# Patient Record
Sex: MALE | Race: WHITE | NOT HISPANIC OR LATINO | Employment: OTHER | URBAN - METROPOLITAN AREA
[De-identification: names, ages, dates, MRNs, and addresses within clinical notes are randomized per-mention and may not be internally consistent; named-entity substitution may affect disease eponyms.]

---

## 2017-01-03 ENCOUNTER — OFFICE VISIT (OUTPATIENT)
Dept: CARDIOLOGY | Facility: CLINIC | Age: 72
End: 2017-01-03

## 2017-01-03 VITALS
HEIGHT: 70 IN | DIASTOLIC BLOOD PRESSURE: 86 MMHG | BODY MASS INDEX: 30.43 KG/M2 | SYSTOLIC BLOOD PRESSURE: 142 MMHG | WEIGHT: 212.6 LBS | HEART RATE: 70 BPM

## 2017-01-03 DIAGNOSIS — I44.2 CHB (COMPLETE HEART BLOCK) (HCC): Primary | ICD-10-CM

## 2017-01-03 DIAGNOSIS — Z95.2 S/P AVR (AORTIC VALVE REPLACEMENT): ICD-10-CM

## 2017-01-03 DIAGNOSIS — Z95.0 PACEMAKER: ICD-10-CM

## 2017-01-03 PROCEDURE — 93288 INTERROG EVL PM/LDLS PM IP: CPT | Performed by: INTERNAL MEDICINE

## 2017-01-03 RX ORDER — ATORVASTATIN CALCIUM 40 MG/1
40 TABLET, FILM COATED ORAL DAILY
COMMUNITY

## 2017-01-03 NOTE — PROGRESS NOTES
Primary Provider:  CHERYL España  CC:CHB    Problem List:  Patient Active Problem List   Diagnosis   • CHB (complete heart block)   • Pacemaker   • Chest tightness   • S/P AVR (aortic valve replacement)   • Essential hypertension   • Dyslipidemia   • COPD (chronic obstructive pulmonary disease)   • Gastroesophageal reflux disease without esophagitis   • Hyperlipidemia   • Hiatal hernia   • Arthritis   PROBLEM LIST:   1. Complete heart block:  a. Status post dual chamber [employer]el pacemaker, 09/16/2013.   2. Status post AVR secondary to bicuspid aortic valve.    3. Ascending aortic aneurysm repair in 2008.    4. Hypertension.  5. Dyslipidemia.  6. COPD.  7. TURP.  8. GERD/hiatal hernia.  9. Arthritis.  10. Hyperlipidemia.        HPI:  The patient denies any chest pain, sob, or chf symptoms.    Allergies   Allergen Reactions   • Percocet [Oxycodone-Acetaminophen]        Current Outpatient Prescriptions   Medication Sig Dispense Refill   • albuterol (PROVENTIL) (2.5 MG/3ML) 0.083% nebulizer solution Take 2.5 mg by nebulization 2 (two) times a day.     • aspirin 81 MG EC tablet Take 81 mg by mouth daily.     • atorvastatin (LIPITOR) 40 MG tablet Take 40 mg by mouth Daily.     • fluticasone-salmeterol (ADVAIR) 100-50 MCG/DOSE DISKUS Inhale 1 puff daily.     • HYDROcodone-acetaminophen (VICODIN) 7.5-500 MG per tablet Take 1 tablet by mouth every 6 (six) hours as needed for moderate pain (4-6).     • ibuprofen (ADVIL,MOTRIN) 200 MG tablet Take 200 mg by mouth every 6 (six) hours as needed for mild pain (1-3).     • ipratropium-albuterol (COMBIVENT RESPIMAT)  MCG/ACT inhaler Inhale 2 puffs 4 (four) times a day as needed for wheezing.     • lisinopril-hydrochlorothiazide (PRINZIDE,ZESTORETIC) 20-12.5 MG per tablet Take 1 tablet by mouth daily.     • lovastatin (MEVACOR) 20 MG tablet Take 20 mg by mouth every night.     • metoprolol tartrate (LOPRESSOR) 25 MG tablet Take 25 mg by mouth 2 (two) times a day.    "  • nystatin (MYCOSTATIN) 890271 UNIT/GM cream Apply  topically As Needed.     • omeprazole (PriLOSEC) 20 MG capsule Take 20 mg by mouth daily.       No current facility-administered medications for this visit.        Visit Vitals   • /86 (BP Location: Right arm, Patient Position: Sitting)   • Pulse 70   • Ht 69.5\" (176.5 cm)   • Wt 212 lb 9.6 oz (96.4 kg)   • BMI 30.95 kg/m2         Device Check    Company [employer] Noel  Mode DDD  Lower Rate 70bpm  Upper rate 130bpm         Thresholds    Atrial Pacing 0.5Volts@0.4ms  Atrial Sensing 2.6mV  Atrial Impedence 400Ohms  Percent Pacing 69%    Right Ventricular Pacing 0.62Volts@0.4ms  Right Ventricular Sensing >12mV  Right Ventricular Impedence 630Ohms  Percent Pacing 99%      Battery Voltage 2.93Volts  Longevity 8.4      Episodes No episodes    Reprogramming No changes    Comments Normal pacemaker function.     Diagnosis Plan   1. CHB (complete heart block)  Normal pacemaker interrogation.  Return for follow-up in one 6 months .  Follow up with Dr. Bhatia regarding VHD.   2. S/P AVR (aortic valve replacement)     3. Pacemaker       Will Kendrick MONTANO   "

## 2017-07-18 ENCOUNTER — CLINICAL SUPPORT NO REQUIREMENTS (OUTPATIENT)
Dept: CARDIOLOGY | Facility: CLINIC | Age: 72
End: 2017-07-18

## 2017-07-18 DIAGNOSIS — I44.2 CHB (COMPLETE HEART BLOCK) (HCC): ICD-10-CM

## 2017-07-18 PROCEDURE — 93288 INTERROG EVL PM/LDLS PM IP: CPT | Performed by: PHYSICIAN ASSISTANT

## 2023-01-05 NOTE — PROGRESS NOTES
Electrophysiology Clinic Consult     Bryn Lynn  1945  [unfilled]  [unfilled]    01/06/23    DATE OF ADMISSION: (Not on file)  [facility] CARDIOLOGY Sumi Haley APRN  [address] / [facility] [address]  Referring Provider: Sri Arcos MD     Chief Complaint   Patient presents with   • Chest Pain   • Dizziness   • Irregular Heart Beat   • Edema       Problem list:  1. Chronic systolic heart failure/Dilated cardiomyopathy/CHB  a. Dual chamber [employer] pacemaker implant Sept 2013  b. Echo 5/2018: EF 55-60%  c. Echo 10/2018: EF 45%, mild MR  d. Echo 4/2022: EF 30-35%, normal functioning tissue AVR  e. 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 Shaver  2. Valvular heart disease status post aortic valve replacement with tissue valve 2013.  3. CAD  a. LHC 2013, nonobstructive CAD  4. HTN  5. HLD  6. COPD on chronic prednisone and 2 L oxygen as needed.  7. GERD  8. Nicotine use (cessation 3/2022)  9. Surgeries:  a. AAA repair, 2008, at   b. Tissue AVR, 2013, at [facility]  c. Umbilical hernia repair 2/2022  d. Prostatectomy, TURP      HPI:  Bryn Lynn is a 77 year old male with above past medical history referred by Dr. Arcos for evaluation for possible coronary sinus lead. He had a [employer] Noel pacemaker placed in September 2013 in the setting of complete heart block. Due to dilated cardiomyopathy, worsening CHF symptoms and EF of 30-35% despite maximal GDMT, it was decided he would benefit from upgrade to BiV ICD. May 2022 he was upgraded to a dual chamber [employer] AICD by Dr. Shaver however there was difficulty placing the LV lead due to occlusion of the left subclavian vein.  Since that time, the patient has had progressive dyspnea on exertion with class III to class IV heart failure symptoms and excessive fatigue and weakness.  He has a long history of tobacco abuse of  smoking 3 packs/day for 60 years but has stopped recently.  He states that his shortness of breath does improve after taking additional Lasix and he does have lower extremity edema as well as PND.  He denies any near-syncope or syncope.  He states he has been compliant with his medical therapy.  His symptoms at this time are severe in nature.  Blood pressure is stable at home.  He does use oxygen as needed basis for shortness of breath.  Denies any excessive alcohol use.  He is on chronic steroids for his underlying lung disease.  The patient does note substernal chest tightness associated with his dyspnea on exertion.    Allergies   Allergen Reactions   • Percocet [Oxycodone-Acetaminophen] Unknown - Low Severity        Cannot display prior to admission medications because the patient has not been admitted in this contact.            Current Outpatient Medications:   •  albuterol (PROVENTIL) (2.5 MG/3ML) 0.083% nebulizer solution, Take 2.5 mg by nebulization 2 (two) times a day., Disp: , Rfl:   •  amLODIPine (NORVASC) 5 MG tablet, Take 5 mg by mouth Daily., Disp: , Rfl:   •  ascorbic acid (VITAMIN C) 1000 MG tablet, Take 2,000 mg by mouth Daily., Disp: , Rfl:   •  aspirin 81 MG EC tablet, Take 81 mg by mouth daily., Disp: , Rfl:   •  atorvastatin (LIPITOR) 40 MG tablet, Take 40 mg by mouth Daily., Disp: , Rfl:   •  carvedilol (COREG) 6.25 MG tablet, Take 6.25 mg by mouth 2 (Two) Times a Day With Meals., Disp: , Rfl:   •  cholecalciferol (VITAMIN D3) 25 MCG (1000 UT) tablet, Take 1,000 Units by mouth Daily., Disp: , Rfl:   •  diphenhydrAMINE (Banophen) 25 mg capsule, Take 25 mg by mouth Every 6 (Six) Hours As Needed for Itching., Disp: , Rfl:   •  finasteride (PROSCAR) 5 MG tablet, Take 5 mg by mouth Daily., Disp: , Rfl:   •  fluticasone (FLONASE) 50 MCG/ACT nasal spray, 2 sprays into the nostril(s) as directed by provider Daily., Disp: , Rfl:   •  fluticasone-salmeterol (ADVAIR) 100-50 MCG/DOSE DISKUS, Inhale 1 puff  daily., Disp: , Rfl:   •  furosemide (LASIX) 40 MG tablet, Take 40 mg by mouth Daily., Disp: , Rfl:   •  HYDROcodone-acetaminophen (VICODIN) 7.5-500 MG per tablet, Take 1 tablet by mouth every 6 (six) hours as needed for moderate pain (4-6)., Disp: , Rfl:   •  meclizine (ANTIVERT) 25 MG tablet, Take 25 mg by mouth 3 (Three) Times a Day As Needed for Dizziness., Disp: , Rfl:   •  montelukast (SINGULAIR) 10 MG tablet, Take 10 mg by mouth Every Night., Disp: , Rfl:   •  multivitamin with minerals tablet tablet, Take 1 tablet by mouth Daily., Disp: , Rfl:   •  O2 (OXYGEN), Inhale 2 L/min As Needed., Disp: , Rfl:   •  Omega 3 1000 MG capsule, Take  by mouth., Disp: , Rfl:   •  omeprazole (PriLOSEC) 20 MG capsule, Take 20 mg by mouth daily., Disp: , Rfl:   •  predniSONE (DELTASONE) 5 MG tablet, Take 5 mg by mouth Daily., Disp: , Rfl:   •  sacubitril-valsartan (ENTRESTO) 49-51 MG tablet, Take 1 tablet by mouth 2 (Two) Times a Day., Disp: , Rfl:   •  spironolactone (ALDACTONE) 25 MG tablet, Take 25 mg by mouth Daily., Disp: , Rfl:   •  Umeclidinium Bromide (Incruse Ellipta) 62.5 MCG/ACT aerosol powder , Inhale 1 puff As Needed., Disp: , Rfl:   •  Vitamin A 3 MG (51384 UT) capsule, Take 10,000 Units by mouth Daily., Disp: , Rfl:   •  vitamin B-12 (CYANOCOBALAMIN) 1000 MCG tablet, Take 1,000 mcg by mouth Daily., Disp: , Rfl:   •  vitamin E 100 UNIT capsule, Take 100 Units by mouth Daily., Disp: , Rfl:     Social History     Socioeconomic History   • Marital status:    Tobacco Use   • Smoking status: Former     Packs/day: 0.50     Types: Cigarettes     Start date: 1963   • Smokeless tobacco: Never   Substance and Sexual Activity   • Alcohol use: No   • Drug use: No   • Sexual activity: Defer       Family History   Problem Relation Age of Onset   • Heart attack Mother    • Kidney failure Mother    • Heart failure Father        REVIEW OF SYSTEMS:   CONST:  No weight loss, fever, chills, weakness + fatigue.   HEENT:  No  visual loss, blurred vision, double vision, yellow sclerae.                   No hearing loss, congestion, sore throat.   SKIN:      No rashes, urticaria, ulcers, sores.     RESP:     + shortness of breath, hemoptysis, +cough, sputum.   GI:           No anorexia, nausea, vomiting, diarrhea. No abdominal pain, melena.   :         No burning on urination, hematuria or increased frequency.  ENDO:    No diaphoresis, cold or heat intolerance. No polyuria or polydipsia.   NEURO:  No headache, dizziness, syncope, paralysis, ataxia, or parasthesias.                  No change in bowel or bladder control. No history of CVA/TIA  MUSC:    + muscle, back pain, joint pain or stiffness.   HEME:    No anemia, bleeding, bruising. No history of DVT/PE.  PSYCH:  No history of depression, anxiety    Vitals:    01/06/23 1123   BP: 138/72   BP Location: Left arm   Patient Position: Sitting   Pulse: 88   SpO2: 97%   Weight: 79.8 kg (176 lb)   Height: 176.5 cm (69.5\")                 Physical Exam:  GEN: Well nourished, well-developed, no acute distress  HEENT: Normocephalic, atraumatic, PERRLA, moist mucous membranes  NECK: Supple, NO JVD, no thyromegaly, no lymphadenopathy  CARD: Normal S1 and S2.  Regular rate rhythm.  There is an S4 as well as S3 present.  PMI not appreciated.  LUNGS: Decreased breath sounds bilaterally.  Respiratory effort poor  ABDOMEN: Soft, nontender, normal bowel sounds  EXTREMITIES: No gross deformities, no clubbing, cyanosis, or edema  SKIN: Warm, dry  NEURO: No focal deficits, alert and oriented x 3  PSYCHIATRIC: Normal affect and mood      I personally viewed and interpreted the patient's EKG/Telemetry/lab data    Lab Results   Component Value Date    GLUCOSE 87 03/16/2016    CALCIUM 10.1 03/16/2016     03/16/2016    K 4.1 03/16/2016    CO2 32 (H) 03/16/2016     03/16/2016    BUN 15 03/16/2016    CREATININE 0.9 03/16/2016    ANIONGAP 7 03/16/2016     Lab Results   Component Value Date    WBC  12.07 (H) 03/16/2016    HGB 14.5 03/16/2016    HCT 42.1 03/16/2016    MCV 89.8 03/16/2016     (L) 03/16/2016     No results found for: INR, PROTIME  No results found for: TSH, A1SEOAV, M3FOWWP, THYROIDAB    ICD interrogation demonstrates normal DDD ICD function.  99% RV paced.  5% right atrial paced.  Nonsustained VT noted.  No atrial fibrillation.      ECG 12 Lead    Date/Time: 1/6/2023 11:39 AM  Performed by: Glynn June MD  Authorized by: Glynn June MD   Comparison: compared with previous ECG from 3/17/2016  Similar to previous ECG  Rhythm: sinus rhythm and paced  BPM: 85          Interrogation of ICD today demonstrates normal DDDR pacemaker function.  100% RV paced.  No underlying R waves seen today.  12 years on the battery.  No atrial fibrillation.     Diagnosis Plan   1. CHF (congestive heart failure), NYHA class III, chronic, systolic ([facility])  ECG 12 Lead      2. CHB (complete heart block) ([facility])  ECG 12 Lead      3. Non-sustained ventricular tachycardia  ECG 12 Lead      4. S/P AVR  ECG 12 Lead      5. Primary hypertension          Impression/plan:    1.  Dyspnea on exertion inpatient with COPD's and on home oxygen in the setting of severe LV dysfunction and class III congestive heart failure symptoms despite optimal medical therapy.  Etiology of LV dysfunction may be ischemic in nature versus chronic RV pacing.  I would like for the patient to undergo a perfusion stress test to ensure that no significant underlying CAD is present and potentially contributing to his LV dysfunction.  I had a long discussion with his wife and the patient today.  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  Schedule the patient an attempt for upgrade in the near future once his stress test was performed locally in [place].  Patient understands all the risk and benefits of the procedure  in detail and wishes to pursue.    2.  COPD/home oxygen/chronic prednisone: Encourage persistent discontinuation of his tobacco use.    3.  Valvular heart disease: Per Dr. Arcos.    4.  Attention: Per Dr. Arcos.

## 2023-01-06 ENCOUNTER — OFFICE VISIT (OUTPATIENT)
Dept: CARDIOLOGY | Facility: CLINIC | Age: 78
End: 2023-01-06
Payer: MEDICARE

## 2023-01-06 VITALS
HEIGHT: 70 IN | SYSTOLIC BLOOD PRESSURE: 138 MMHG | HEART RATE: 88 BPM | WEIGHT: 176 LBS | OXYGEN SATURATION: 97 % | DIASTOLIC BLOOD PRESSURE: 72 MMHG | BODY MASS INDEX: 25.2 KG/M2

## 2023-01-06 DIAGNOSIS — I47.29 NON-SUSTAINED VENTRICULAR TACHYCARDIA: ICD-10-CM

## 2023-01-06 DIAGNOSIS — Z95.2 S/P AVR: ICD-10-CM

## 2023-01-06 DIAGNOSIS — I10 PRIMARY HYPERTENSION: ICD-10-CM

## 2023-01-06 DIAGNOSIS — I50.22 CHF (CONGESTIVE HEART FAILURE), NYHA CLASS III, CHRONIC, SYSTOLIC: Primary | ICD-10-CM

## 2023-01-06 DIAGNOSIS — I44.2 CHB (COMPLETE HEART BLOCK): ICD-10-CM

## 2023-01-06 PROBLEM — I50.9 CHF (CONGESTIVE HEART FAILURE), NYHA CLASS III: Status: ACTIVE | Noted: 2023-01-06

## 2023-01-06 PROCEDURE — 99204 OFFICE O/P NEW MOD 45 MIN: CPT | Performed by: INTERNAL MEDICINE

## 2023-01-06 PROCEDURE — 93000 ELECTROCARDIOGRAM COMPLETE: CPT | Performed by: INTERNAL MEDICINE

## 2023-01-06 PROCEDURE — 93283 PRGRMG EVAL IMPLANTABLE DFB: CPT | Performed by: INTERNAL MEDICINE

## 2023-01-06 RX ORDER — MONTELUKAST SODIUM 10 MG/1
10 TABLET ORAL NIGHTLY
COMMUNITY

## 2023-01-06 RX ORDER — FLUTICASONE PROPIONATE 50 MCG
2 SPRAY, SUSPENSION (ML) NASAL DAILY
COMMUNITY

## 2023-01-06 RX ORDER — DIPHENHYDRAMINE HCL 25 MG
25 CAPSULE ORAL EVERY 6 HOURS PRN
COMMUNITY

## 2023-01-06 RX ORDER — UMECLIDINIUM 62.5 UG/1
1 AEROSOL, POWDER ORAL AS NEEDED
COMMUNITY

## 2023-01-06 RX ORDER — MELATONIN
1000 DAILY
COMMUNITY

## 2023-01-06 RX ORDER — LANOLIN ALCOHOL/MO/W.PET/CERES
1000 CREAM (GRAM) TOPICAL DAILY
COMMUNITY

## 2023-01-06 RX ORDER — FUROSEMIDE 40 MG/1
40 TABLET ORAL DAILY
COMMUNITY

## 2023-01-06 RX ORDER — AMLODIPINE BESYLATE 5 MG/1
5 TABLET ORAL DAILY
COMMUNITY
End: 2023-02-11 | Stop reason: HOSPADM

## 2023-01-06 RX ORDER — MULTIVIT WITH MINERALS/LUTEIN
2000 TABLET ORAL DAILY
COMMUNITY

## 2023-01-06 RX ORDER — CARVEDILOL 6.25 MG/1
6.25 TABLET ORAL 2 TIMES DAILY WITH MEALS
COMMUNITY

## 2023-01-06 RX ORDER — MECLIZINE HYDROCHLORIDE 25 MG/1
25 TABLET ORAL 3 TIMES DAILY PRN
COMMUNITY

## 2023-01-06 RX ORDER — FINASTERIDE 5 MG/1
5 TABLET, FILM COATED ORAL DAILY
COMMUNITY

## 2023-01-06 RX ORDER — PREDNISONE 1 MG/1
5 TABLET ORAL DAILY
COMMUNITY

## 2023-01-06 RX ORDER — MULTIPLE VITAMINS W/ MINERALS TAB 9MG-400MCG
1 TAB ORAL DAILY
COMMUNITY

## 2023-01-06 RX ORDER — OMEGA-3 FATTY ACIDS/FISH OIL 300-1000MG
CAPSULE ORAL
COMMUNITY

## 2023-01-06 RX ORDER — SPIRONOLACTONE 25 MG/1
25 TABLET ORAL DAILY
COMMUNITY

## 2023-01-11 PROBLEM — I50.22 CHF (CONGESTIVE HEART FAILURE), NYHA CLASS III, CHRONIC, SYSTOLIC: Status: ACTIVE | Noted: 2023-01-11

## 2023-02-01 RX ORDER — ALPRAZOLAM 0.25 MG/1
0.25 TABLET ORAL
Qty: 1 TABLET | Refills: 0 | Status: SHIPPED | OUTPATIENT
Start: 2023-02-01 | End: 2023-02-01

## 2023-02-07 ENCOUNTER — PREP FOR SURGERY (OUTPATIENT)
Dept: OTHER | Facility: HOSPITAL | Age: 78
End: 2023-02-07
Payer: MEDICARE

## 2023-02-07 DIAGNOSIS — I50.22 CHF (CONGESTIVE HEART FAILURE), NYHA CLASS III, CHRONIC, SYSTOLIC: Primary | ICD-10-CM

## 2023-02-07 RX ORDER — SODIUM CHLORIDE 9 MG/ML
1 INJECTION, SOLUTION INTRAVENOUS CONTINUOUS
Status: CANCELLED | OUTPATIENT
Start: 2023-02-07 | End: 2023-02-07

## 2023-02-07 RX ORDER — ACETAMINOPHEN 325 MG/1
650 TABLET ORAL EVERY 4 HOURS PRN
Status: CANCELLED | OUTPATIENT
Start: 2023-02-07

## 2023-02-07 RX ORDER — NITROGLYCERIN 0.4 MG/1
0.4 TABLET SUBLINGUAL
Status: CANCELLED | OUTPATIENT
Start: 2023-02-07

## 2023-02-07 RX ORDER — CEFAZOLIN SODIUM 2 G/100ML
2 INJECTION, SOLUTION INTRAVENOUS ONCE
Status: CANCELLED | OUTPATIENT
Start: 2023-02-07 | End: 2023-02-07

## 2023-02-08 ENCOUNTER — TELEPHONE (OUTPATIENT)
Dept: ADMINISTRATIVE | Facility: HOSPITAL | Age: 78
End: 2023-02-08
Payer: MEDICARE

## 2023-02-08 NOTE — TELEPHONE ENCOUNTER
Called pt and informed him we will proceed with procedure on 2/10 without having stress test prior per GFT. Pt not able to tolerate stress test, multiple attempts at [facility].

## 2023-02-10 ENCOUNTER — APPOINTMENT (OUTPATIENT)
Dept: GENERAL RADIOLOGY | Facility: HOSPITAL | Age: 78
End: 2023-02-10
Payer: MEDICARE

## 2023-02-10 ENCOUNTER — HOSPITAL ENCOUNTER (OUTPATIENT)
Facility: HOSPITAL | Age: 78
Discharge: HOME | End: 2023-02-11
Attending: INTERNAL MEDICINE | Admitting: INTERNAL MEDICINE
Payer: MEDICARE

## 2023-02-10 DIAGNOSIS — I50.22 CHF (CONGESTIVE HEART FAILURE), NYHA CLASS III, CHRONIC, SYSTOLIC: ICD-10-CM

## 2023-02-10 DIAGNOSIS — I50.22 CHRONIC SYSTOLIC CONGESTIVE HEART FAILURE, NYHA CLASS 3: Primary | ICD-10-CM

## 2023-02-10 DIAGNOSIS — J41.0 SIMPLE CHRONIC BRONCHITIS: ICD-10-CM

## 2023-02-10 LAB
ANION GAP SERPL CALCULATED.3IONS-SCNC: 10 MMOL/L (ref 5–15)
BUN BLDA-MCNC: 38 MG/DL (ref 8–26)
BUN SERPL-MCNC: 35 MG/DL (ref 8–23)
BUN/CREAT SERPL: 32.1 (ref 7–25)
CA-I BLDA-SCNC: 1.19 MMOL/L (ref 1.2–1.32)
CALCIUM SPEC-SCNC: 9.2 MG/DL (ref 8.6–10.5)
CHLORIDE BLDA-SCNC: 95 MMOL/L (ref 98–109)
CHLORIDE SERPL-SCNC: 98 MMOL/L (ref 98–107)
CO2 BLDA-SCNC: 35 MMOL/L (ref 24–29)
CO2 SERPL-SCNC: 34 MMOL/L (ref 22–29)
CREAT BLDA-MCNC: 1 MG/DL (ref 0.6–1.3)
CREAT SERPL-MCNC: 1.09 MG/DL (ref 0.76–1.27)
DEPRECATED RDW RBC AUTO: 42.2 FL (ref 37–54)
EGFRCR SERPLBLD CKD-EPI 2021: 70.8 ML/MIN/1.73
EGFRCR SERPLBLD CKD-EPI 2021: 78.5 ML/MIN/1.73
ERYTHROCYTE [DISTWIDTH] IN BLOOD BY AUTOMATED COUNT: 12.2 % (ref 12.3–15.4)
GLUCOSE BLDC GLUCOMTR-MCNC: 119 MG/DL (ref 70–130)
GLUCOSE SERPL-MCNC: 128 MG/DL (ref 65–99)
HBA1C MFR BLD: 5.6 % (ref 4.8–5.6)
HCT VFR BLD AUTO: 36 % (ref 37.5–51)
HCT VFR BLDA CALC: 36 % (ref 38–51)
HGB BLD-MCNC: 12.4 G/DL (ref 13–17.7)
HGB BLDA-MCNC: 12.2 G/DL (ref 12–17)
MCH RBC QN AUTO: 32.8 PG (ref 26.6–33)
MCHC RBC AUTO-ENTMCNC: 34.4 G/DL (ref 31.5–35.7)
MCV RBC AUTO: 95.2 FL (ref 79–97)
PLATELET # BLD AUTO: 221 10*3/MM3 (ref 140–450)
PMV BLD AUTO: 11 FL (ref 6–12)
POTASSIUM BLDA-SCNC: 4.2 MMOL/L (ref 3.5–4.9)
POTASSIUM SERPL-SCNC: 4.4 MMOL/L (ref 3.5–5.2)
RBC # BLD AUTO: 3.78 10*6/MM3 (ref 4.14–5.8)
SODIUM BLD-SCNC: 139 MMOL/L (ref 138–146)
SODIUM SERPL-SCNC: 142 MMOL/L (ref 136–145)
WBC NRBC COR # BLD: 14.25 10*3/MM3 (ref 3.4–10.8)

## 2023-02-10 PROCEDURE — 33225 L VENTRIC PACING LEAD ADD-ON: CPT | Performed by: INTERNAL MEDICINE

## 2023-02-10 PROCEDURE — C1894 INTRO/SHEATH, NON-LASER: HCPCS | Performed by: INTERNAL MEDICINE

## 2023-02-10 PROCEDURE — C1725 CATH, TRANSLUMIN NON-LASER: HCPCS | Performed by: INTERNAL MEDICINE

## 2023-02-10 PROCEDURE — 0 IOPAMIDOL PER 1 ML: Performed by: INTERNAL MEDICINE

## 2023-02-10 PROCEDURE — 85027 COMPLETE CBC AUTOMATED: CPT | Performed by: INTERNAL MEDICINE

## 2023-02-10 PROCEDURE — 25010000002 ONDANSETRON PER 1 MG: Performed by: INTERNAL MEDICINE

## 2023-02-10 PROCEDURE — 25010000002 FENTANYL CITRATE (PF) 50 MCG/ML SOLUTION: Performed by: INTERNAL MEDICINE

## 2023-02-10 PROCEDURE — 0 LIDOCAINE 1 % SOLUTION: Performed by: INTERNAL MEDICINE

## 2023-02-10 PROCEDURE — C1892 INTRO/SHEATH,FIXED,PEEL-AWAY: HCPCS | Performed by: INTERNAL MEDICINE

## 2023-02-10 PROCEDURE — 25010000002 MIDAZOLAM PER 1 MG: Performed by: INTERNAL MEDICINE

## 2023-02-10 PROCEDURE — 80047 BASIC METABLC PNL IONIZED CA: CPT

## 2023-02-10 PROCEDURE — 25010000002 CEFAZOLIN IN DEXTROSE 2-4 GM/100ML-% SOLUTION

## 2023-02-10 PROCEDURE — C1874 STENT, COATED/COV W/DEL SYS: HCPCS | Performed by: INTERNAL MEDICINE

## 2023-02-10 PROCEDURE — 94664 DEMO&/EVAL PT USE INHALER: CPT

## 2023-02-10 PROCEDURE — C1769 GUIDE WIRE: HCPCS | Performed by: INTERNAL MEDICINE

## 2023-02-10 PROCEDURE — 85014 HEMATOCRIT: CPT

## 2023-02-10 PROCEDURE — C1882 AICD, OTHER THAN SING/DUAL: HCPCS | Performed by: INTERNAL MEDICINE

## 2023-02-10 PROCEDURE — 36415 COLL VENOUS BLD VENIPUNCTURE: CPT

## 2023-02-10 PROCEDURE — 99152 MOD SED SAME PHYS/QHP 5/>YRS: CPT | Performed by: INTERNAL MEDICINE

## 2023-02-10 PROCEDURE — C1900 LEAD, CORONARY VENOUS: HCPCS | Performed by: INTERNAL MEDICINE

## 2023-02-10 PROCEDURE — 83036 HEMOGLOBIN GLYCOSYLATED A1C: CPT

## 2023-02-10 PROCEDURE — 94640 AIRWAY INHALATION TREATMENT: CPT

## 2023-02-10 PROCEDURE — 80048 BASIC METABOLIC PNL TOTAL CA: CPT | Performed by: INTERNAL MEDICINE

## 2023-02-10 PROCEDURE — 71046 X-RAY EXAM CHEST 2 VIEWS: CPT

## 2023-02-10 PROCEDURE — 99153 MOD SED SAME PHYS/QHP EA: CPT | Performed by: INTERNAL MEDICINE

## 2023-02-10 PROCEDURE — C1882 AICD, OTHER THAN SING/DUAL: HCPCS

## 2023-02-10 PROCEDURE — 33264 RMVL & RPLCMT DFB GEN MLT LD: CPT | Performed by: INTERNAL MEDICINE

## 2023-02-10 PROCEDURE — C1730 CATH, EP, 19 OR FEW ELECT: HCPCS | Performed by: INTERNAL MEDICINE

## 2023-02-10 PROCEDURE — 37248 TRLUML BALO ANGIOP 1ST VEIN: CPT | Performed by: INTERNAL MEDICINE

## 2023-02-10 DEVICE — CARDIAC RESYNCHRONIZATION THERAPY DEFIBRILLATOR
Type: IMPLANTABLE DEVICE | Status: FUNCTIONAL
Brand: VIGILANT™ X4 CRT-D

## 2023-02-10 DEVICE — PACE/SENSE LEAD
Type: IMPLANTABLE DEVICE | Site: CHEST | Status: FUNCTIONAL
Brand: ACUITY™ X4 SPIRAL L

## 2023-02-10 RX ORDER — BUPIVACAINE HYDROCHLORIDE 5 MG/ML
INJECTION, SOLUTION PERINEURAL
Status: DISCONTINUED | OUTPATIENT
Start: 2023-02-10 | End: 2023-02-10 | Stop reason: HOSPADM

## 2023-02-10 RX ORDER — ACETAMINOPHEN 325 MG/1
650 TABLET ORAL EVERY 4 HOURS PRN
Status: DISCONTINUED | OUTPATIENT
Start: 2023-02-10 | End: 2023-02-10 | Stop reason: HOSPADM

## 2023-02-10 RX ORDER — FUROSEMIDE 40 MG/1
40 TABLET ORAL DAILY
Status: DISCONTINUED | OUTPATIENT
Start: 2023-02-11 | End: 2023-02-10

## 2023-02-10 RX ORDER — CEFAZOLIN SODIUM 2 G/100ML
2 INJECTION, SOLUTION INTRAVENOUS ONCE
Status: COMPLETED | OUTPATIENT
Start: 2023-02-10 | End: 2023-02-10

## 2023-02-10 RX ORDER — CARVEDILOL 6.25 MG/1
6.25 TABLET ORAL 2 TIMES DAILY WITH MEALS
Status: DISCONTINUED | OUTPATIENT
Start: 2023-02-10 | End: 2023-02-10

## 2023-02-10 RX ORDER — AMLODIPINE BESYLATE 5 MG/1
5 TABLET ORAL DAILY
Status: DISCONTINUED | OUTPATIENT
Start: 2023-02-11 | End: 2023-02-10

## 2023-02-10 RX ORDER — SODIUM CHLORIDE 0.9 % (FLUSH) 0.9 %
3 SYRINGE (ML) INJECTION EVERY 12 HOURS SCHEDULED
Status: DISCONTINUED | OUTPATIENT
Start: 2023-02-10 | End: 2023-02-11 | Stop reason: HOSPADM

## 2023-02-10 RX ORDER — ACETAMINOPHEN 650 MG/1
650 SUPPOSITORY RECTAL EVERY 4 HOURS PRN
Status: DISCONTINUED | OUTPATIENT
Start: 2023-02-10 | End: 2023-02-11 | Stop reason: HOSPADM

## 2023-02-10 RX ORDER — SODIUM CHLORIDE 9 MG/ML
INJECTION, SOLUTION INTRAVENOUS
Status: COMPLETED | OUTPATIENT
Start: 2023-02-10 | End: 2023-02-10

## 2023-02-10 RX ORDER — SODIUM CHLORIDE 9 MG/ML
1 INJECTION, SOLUTION INTRAVENOUS CONTINUOUS
Status: ACTIVE | OUTPATIENT
Start: 2023-02-10 | End: 2023-02-10

## 2023-02-10 RX ORDER — PREDNISONE 1 MG/1
5 TABLET ORAL DAILY
Status: DISCONTINUED | OUTPATIENT
Start: 2023-02-11 | End: 2023-02-11 | Stop reason: HOSPADM

## 2023-02-10 RX ORDER — HYDROCODONE BITARTRATE AND ACETAMINOPHEN 7.5; 325 MG/1; MG/1
1 TABLET ORAL ONCE
Status: COMPLETED | OUTPATIENT
Start: 2023-02-10 | End: 2023-02-11

## 2023-02-10 RX ORDER — MIDAZOLAM HYDROCHLORIDE 1 MG/ML
INJECTION INTRAMUSCULAR; INTRAVENOUS
Status: DISCONTINUED | OUTPATIENT
Start: 2023-02-10 | End: 2023-02-10 | Stop reason: HOSPADM

## 2023-02-10 RX ORDER — NITROGLYCERIN 0.4 MG/1
0.4 TABLET SUBLINGUAL
Status: DISCONTINUED | OUTPATIENT
Start: 2023-02-10 | End: 2023-02-10 | Stop reason: HOSPADM

## 2023-02-10 RX ORDER — ONDANSETRON 2 MG/ML
4 INJECTION INTRAMUSCULAR; INTRAVENOUS EVERY 6 HOURS PRN
Status: DISCONTINUED | OUTPATIENT
Start: 2023-02-10 | End: 2023-02-11 | Stop reason: HOSPADM

## 2023-02-10 RX ORDER — LIDOCAINE HYDROCHLORIDE 10 MG/ML
INJECTION, SOLUTION INFILTRATION; PERINEURAL
Status: DISCONTINUED | OUTPATIENT
Start: 2023-02-10 | End: 2023-02-10 | Stop reason: HOSPADM

## 2023-02-10 RX ORDER — ONDANSETRON 2 MG/ML
INJECTION INTRAMUSCULAR; INTRAVENOUS
Status: DISCONTINUED | OUTPATIENT
Start: 2023-02-10 | End: 2023-02-10 | Stop reason: HOSPADM

## 2023-02-10 RX ORDER — SODIUM CHLORIDE 9 MG/ML
40 INJECTION, SOLUTION INTRAVENOUS AS NEEDED
Status: DISCONTINUED | OUTPATIENT
Start: 2023-02-10 | End: 2023-02-11 | Stop reason: HOSPADM

## 2023-02-10 RX ORDER — FENTANYL CITRATE 50 UG/ML
INJECTION, SOLUTION INTRAMUSCULAR; INTRAVENOUS
Status: DISCONTINUED | OUTPATIENT
Start: 2023-02-10 | End: 2023-02-10 | Stop reason: HOSPADM

## 2023-02-10 RX ORDER — SPIRONOLACTONE 25 MG/1
25 TABLET ORAL DAILY
Status: DISCONTINUED | OUTPATIENT
Start: 2023-02-11 | End: 2023-02-10

## 2023-02-10 RX ORDER — SODIUM CHLORIDE 0.9 % (FLUSH) 0.9 %
10 SYRINGE (ML) INJECTION AS NEEDED
Status: DISCONTINUED | OUTPATIENT
Start: 2023-02-10 | End: 2023-02-11 | Stop reason: HOSPADM

## 2023-02-10 RX ORDER — ALBUTEROL SULFATE 2.5 MG/3ML
2.5 SOLUTION RESPIRATORY (INHALATION)
Status: DISCONTINUED | OUTPATIENT
Start: 2023-02-10 | End: 2023-02-11 | Stop reason: HOSPADM

## 2023-02-10 RX ORDER — ACETAMINOPHEN 325 MG/1
650 TABLET ORAL EVERY 4 HOURS PRN
Status: DISCONTINUED | OUTPATIENT
Start: 2023-02-10 | End: 2023-02-11 | Stop reason: HOSPADM

## 2023-02-10 RX ADMIN — ALBUTEROL SULFATE 2.5 MG: 2.5 SOLUTION RESPIRATORY (INHALATION) at 21:14

## 2023-02-10 RX ADMIN — CEFAZOLIN SODIUM 2 G: 2 INJECTION, SOLUTION INTRAVENOUS at 13:14

## 2023-02-10 RX ADMIN — SODIUM CHLORIDE 1 ML/KG/HR: 9 INJECTION, SOLUTION INTRAVENOUS at 12:53

## 2023-02-10 NOTE — H&P
Cardiology Consult/H&P     Bryn Lynn  1945  [phone]  There is no work phone number on file.    02/10/23    DATE OF ADMISSION: 2/10/2023  [facility] Sumi Arellano, APRN  [address] / [place] [id number]  Referring Provider: Glynn June MD     CC: SOB    Problem list:  1. Chronic systolic heart failure/Dilated cardiomyopathy/CHB  a. Dual chamber [employer] pacemaker implant Sept 2013  b. Echo 5/2018: EF 55-60%  c. Echo 10/2018: EF 45%, mild MR  d. Echo 4/2022: EF 30-35%, normal functioning tissue AVR  e. Dual chamber [employer] AICD May 2022 - total occlusion of left subclavian and LV lead was unable to be placed by Dr. Shaver  2. Valvular heart disease status post aortic valve replacement with tissue valve 2013.  3. CAD  a. LHC 2013, nonobstructive CAD  4. HTN  5. HLD  6. COPD on chronic prednisone and 2 L oxygen as needed.  7. GERD  8. Nicotine use (cessation 3/2022)  9. Surgeries:  a. AAA repair, 2008, at   b. Tissue AVR, 2013, at [facility]  c. Umbilical hernia repair 2/2022  d. Prostatectomy, TURP    History of Present Illness:   Bryn Lynn is a 77-year-old male with above past medical history presenting today for scheduled BiV ICD upgrade.  He had a [employer]el pacemaker placed September 2013 in the setting of complete heart block.  Due to dilated cardiomyopathy and worsening CHF symptoms with EF of 30 to 35% despite maximal GDMT it was decided patient would benefit from upgrade to BiV ICD.  May 2022 he was upgraded to dual-chamber [employer] AICD by Dr. Shaver however there was difficulty placing an LV lead due to occlusion of left subclavian vein.  Patient has continued to have progressive dyspnea on exertion, fatigue and weakness, class III-IV heart failure symptoms. He went for a stress test last week but was unable to complete the study due to his breathing. He denies CP, palpitations, recent hospitalization, infection, fevers, symptoms of  CVA/TIA.      Allergies   Allergen Reactions   • Percocet [Oxycodone-Acetaminophen] Unknown - Low Severity        Cannot display prior to admission medications because the patient has not been admitted in this contact.            Current Facility-Administered Medications:   •  acetaminophen (TYLENOL) tablet 650 mg, 650 mg, Oral, Q4H PRN, Rashmi Olivier APRN  •  ceFAZolin in dextrose (ANCEF) IVPB solution 2 g, 2 g, Intravenous, Once, Rashmi Olivier APRN  •  nitroglycerin (NITROSTAT) SL tablet 0.4 mg, 0.4 mg, Sublingual, Q5 Min PRN, Rashmi Olivier APRN  •  sodium chloride 0.9 % infusion, 1 mL/kg/hr (Order-Specific), Intravenous, Continuous, Rashmi Olivier APRN, Last Rate: 79.8 mL/hr at 02/10/23 1253, 1 mL/kg/hr at 02/10/23 1253    Social History     Socioeconomic History   • Marital status:    Tobacco Use   • Smoking status: Former     Packs/day: 0.50     Types: Cigarettes     Start date: 1963   • Smokeless tobacco: Never   Substance and Sexual Activity   • Alcohol use: No   • Drug use: No   • Sexual activity: Defer       Family History   Problem Relation Age of Onset   • Heart attack Mother    • Kidney failure Mother    • Heart failure Father        REVIEW OF SYSTEMS:   CONSTITUTIONAL:         No weight loss, fever, chills, +weakness +fatigue.   HEENT:                            No visual loss, blurred vision, double vision, yellow sclerae.                                             No hearing loss, congestion, sore throat.   SKIN:                                No rashes, urticaria, ulcers, sores.     RESPIRATORY:               +shortness of breath,- hemoptysis, cough, sputum.   GI:                                     No anorexia, nausea, vomiting, diarrhea. No abdominal pain, melena.   :                                   No burning on urination, hematuria or increased frequency.  ENDOCRINE:                   No diaphoresis, cold or heat intolerance. No polyuria or  "polydipsia.   NEURO:                            No headache, +dizziness, -syncope, paralysis, ataxia, or parasthesias.                                            No change in bowel or bladder control. No history of CVA/TIA  MUSCULOSKELETAL:    No muscle, back pain, joint pain or stiffness.   HEMATOLOGY:               No anemia, bleeding, bruising. No history of DVT/PE.  PSYCH:                            No history of depression, anxiety    Vitals:    02/10/23 1216 02/10/23 1218   BP: 112/72 102/61   BP Location: Right arm Left arm   Patient Position: Lying Lying   Pulse: 77 78   Resp: 18    Temp: 98 °F (36.7 °C)    TempSrc: Temporal    SpO2: 92%    Weight: 76 kg (167 lb 9.6 oz)    Height: 177.8 cm (70\")          Vital Sign Min/Max for last 24 hours  Temp  Min: 98 °F (36.7 °C)  Max: 98 °F (36.7 °C)   BP  Min: 102/61  Max: 112/72   Pulse  Min: 77  Max: 78   Resp  Min: 18  Max: 18   SpO2  Min: 92 %  Max: 92 %   No data recorded    No intake or output data in the 24 hours ending 02/10/23 1259          Physical Exam:  GEN: Well nourished, Well- developed  No acute distress  HEENT: Normocephalic, Atraumatic, PERRLA, moist mucous membranes  NECK: supple, NO JVD, no thyromegaly, no lymphadenopathy  CARDIAC: S1S2, S4 and S3 present, RRR  LUNGS: Bilateral upper lobe wheezing  ABDOMEN: Soft, nontender, normal bowel sounds  EXTREMITIES:No gross deformities,  No clubbing, cyanosis, or edema  SKIN: Warm, dry  NEURO: No focal deficits  PSYCHIATRIC: Normal affect and mood      I personally viewed and interpreted the patient's EKG/Telemetry/lab data    Data:   Results from last 7 days   Lab Units 02/10/23  1236 02/10/23  1220   WBC 10*3/mm3  --  14.25*   HEMOGLOBIN g/dL  --  12.4*   HEMOGLOBIN, POC g/dL 12.2  --    HEMATOCRIT %  --  36.0*   HEMATOCRIT POC % 36*  --    PLATELETS 10*3/mm3  --  221     Results from last 7 days   Lab Units 02/10/23  1236   CREATININE mg/dL 1.00                                  No intake or output data " in the 24 hours ending 02/10/23 1259      Telemetry: V-paced, HR 80 bpm        Assessment and Plan:   1.  Chronic systolic heart failure/Dilated cardiomyopathy/CHB  -s/p dual chamber [employer] pacemaker implant Sept 2013  -Attempt to upgrade to BiV ICD by Dr. Shaver however LV lead unable to be placed due to total occlusion of left subclavian   -NYHA class III-IV congestive heart failure symptoms despite optimal medical therapy.   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 Patient understands all the risk and benefits of the procedure in detail and wishes to pursue.  -We will proceed with upgrade to BiV pacemaker ICD today      2.  COPD/home oxygen/chronic prednisone: Encourage persistent discontinuation of his tobacco use.     3.  Valvular heart disease: Per Dr. Arcos.    Electronically signed by CHERYL Patterson, 02/10/23, 9:01 AM EST.

## 2023-02-11 VITALS
SYSTOLIC BLOOD PRESSURE: 117 MMHG | OXYGEN SATURATION: 100 % | HEIGHT: 70 IN | BODY MASS INDEX: 23.99 KG/M2 | TEMPERATURE: 98.3 F | WEIGHT: 167.6 LBS | HEART RATE: 77 BPM | DIASTOLIC BLOOD PRESSURE: 54 MMHG | RESPIRATION RATE: 18 BRPM

## 2023-02-11 LAB
ANION GAP SERPL CALCULATED.3IONS-SCNC: 8 MMOL/L (ref 5–15)
BUN SERPL-MCNC: 30 MG/DL (ref 8–23)
BUN/CREAT SERPL: 38 (ref 7–25)
CALCIUM SPEC-SCNC: 8.8 MG/DL (ref 8.6–10.5)
CHLORIDE SERPL-SCNC: 98 MMOL/L (ref 98–107)
CO2 SERPL-SCNC: 31 MMOL/L (ref 22–29)
CREAT SERPL-MCNC: 0.79 MG/DL (ref 0.76–1.27)
EGFRCR SERPLBLD CKD-EPI 2021: 92.6 ML/MIN/1.73
GLUCOSE SERPL-MCNC: 109 MG/DL (ref 65–99)
POTASSIUM SERPL-SCNC: 4.2 MMOL/L (ref 3.5–5.2)
SODIUM SERPL-SCNC: 137 MMOL/L (ref 136–145)

## 2023-02-11 PROCEDURE — 94799 UNLISTED PULMONARY SVC/PX: CPT

## 2023-02-11 PROCEDURE — 63710000001 ACETAMINOPHEN 325 MG TABLET: Performed by: INTERNAL MEDICINE

## 2023-02-11 PROCEDURE — 99024 POSTOP FOLLOW-UP VISIT: CPT | Performed by: INTERNAL MEDICINE

## 2023-02-11 PROCEDURE — A9270 NON-COVERED ITEM OR SERVICE: HCPCS

## 2023-02-11 PROCEDURE — 63710000001 PREDNISONE PER 5 MG

## 2023-02-11 PROCEDURE — A9270 NON-COVERED ITEM OR SERVICE: HCPCS | Performed by: INTERNAL MEDICINE

## 2023-02-11 PROCEDURE — A9270 NON-COVERED ITEM OR SERVICE: HCPCS | Performed by: NURSE PRACTITIONER

## 2023-02-11 PROCEDURE — 94664 DEMO&/EVAL PT USE INHALER: CPT

## 2023-02-11 PROCEDURE — 25010000002 KETOROLAC TROMETHAMINE PER 15 MG: Performed by: NURSE PRACTITIONER

## 2023-02-11 PROCEDURE — 80048 BASIC METABOLIC PNL TOTAL CA: CPT | Performed by: INTERNAL MEDICINE

## 2023-02-11 PROCEDURE — 63710000001 HYDROCODONE-ACETAMINOPHEN 7.5-325 MG TABLET: Performed by: NURSE PRACTITIONER

## 2023-02-11 RX ORDER — KETOROLAC TROMETHAMINE 15 MG/ML
15 INJECTION, SOLUTION INTRAMUSCULAR; INTRAVENOUS EVERY 6 HOURS PRN
Status: DISCONTINUED | OUTPATIENT
Start: 2023-02-11 | End: 2023-02-11

## 2023-02-11 RX ADMIN — PREDNISONE 5 MG: 5 TABLET ORAL at 08:42

## 2023-02-11 RX ADMIN — ACETAMINOPHEN 325MG 650 MG: 325 TABLET ORAL at 06:08

## 2023-02-11 RX ADMIN — KETOROLAC TROMETHAMINE 15 MG: 15 INJECTION, SOLUTION INTRAMUSCULAR; INTRAVENOUS at 08:42

## 2023-02-11 RX ADMIN — ALBUTEROL SULFATE 2.5 MG: 2.5 SOLUTION RESPIRATORY (INHALATION) at 10:31

## 2023-02-11 RX ADMIN — HYDROCODONE BITARTRATE AND ACETAMINOPHEN 1 TABLET: 7.5; 325 TABLET ORAL at 03:29

## 2023-02-11 NOTE — CASE MANAGEMENT/SOCIAL WORK
Discharge Planning Assessment  [facility]     Patient Name: Bryn Lynn  MRN: [id number]  Today's Date: 2/11/2023    Admit Date: 2/10/2023    Plan: home with HH   Discharge Needs Assessment     Row Name 02/11/23 1113       Living Environment    People in Home spouse    Name(s) of People in Home Concepcion Lynn    Current Living Arrangements home    Primary Care Provided by self    Provides Primary Care For no one    Family Caregiver if Needed child(quique), adult;spouse    Family Caregiver Names Concepcion Lynn    Quality of Family Relationships helpful;involved;supportive    Able to Return to Prior Arrangements yes       Resource/Environmental Concerns    Resource/Environmental Concerns none       Transition Planning    Patient/Family Anticipates Transition to home    Patient/Family Anticipated Services at Transition none    Transportation Anticipated family or friend will provide       Discharge Needs Assessment    Readmission Within the Last 30 Days no previous admission in last 30 days    Equipment Currently Used at Home oxygen    Concerns to be Addressed discharge planning;denies needs/concerns at this time    Anticipated Changes Related to Illness none    Equipment Needed After Discharge none    Current Discharge Risk chronically ill    Discharge Coordination/Progress Home with HH               Discharge Plan     Row Name 02/11/23 1118       Plan    Plan home with HH    Patient/Family in Agreement with Plan yes    Plan Comments Patient has orders for discharge.  Received a call from RN to assess for home O2.  Spoke with patient and familiy at bedside regarding discharge planning.  CM on RA currently with O2 sats ranging from 93%-97%.  Patient denies use of HH but would like to have it when he returns home, provided list of HH agencies and they have reqeusted referral be called to [facility] as a first choice and [facility] as a second choice.  He denies the use of DME and reports he has prescription coverage and  medicaions are affordable.  He lives with his wife in a single level house with ground level entry.  He did not received the COVID vaccine.  Patient reports that he has an O2 concentrator at home but no portables and was hoping to obtain that today.  Discusse qulifications for home O2 and having a portable tank and offered to obtain portable tanks as private pay with cost of approximately $100/mo.  Patient declined.  No other discharge needs verbalized.  Called [facility] but there was no oncall service only .  Called [facility] [phone] and reached the answering service who has paged the On Call RN.  SAMPSON following.  Patient plan is to discharge home with [facility] if accepted to service with family to transport.    Final Discharge Disposition Code 06 - home with home health care              Continued Care and Services - Admitted Since 2/10/2023     Home Medical Care     Service Provider Request Status Selected Services Address Phone Fax Patient Preferred    [facility] Pending - No Request Sent N/A [address] [facility] [address] [phone] -- --              Expected Discharge Date and Time     Expected Discharge Date Expected Discharge Time    Feb 11, 2023          Demographic Summary     Row Name 02/11/23 1111       General Information    Admission Type inpatient    Arrived From home    Referral Source admission list    Reason for Consult discharge planning    Preferred Language English    General Information Comments CHERYL Brady       Contact Information    Permission Granted to Share Info With     Contact Information Obtained for     Contact Information Comments Concepcion Lynn, spouse  [phone]               Functional Status     Row Name 02/11/23 1112       Functional Status    Usual Activity Tolerance good    Current Activity Tolerance good       Functional Status, IADL    Medications independent    Meal Preparation  independent    Housekeeping independent    Laundry independent    Shopping independent       Employment/    Employment/ Comments Medicare/[place] Medicaid               Psychosocial    No documentation.                Abuse/Neglect    No documentation.                Legal    No documentation.                Substance Abuse    No documentation.                Patient Forms    No documentation.                   Valeria Nieto RN

## 2023-02-11 NOTE — CASE MANAGEMENT/SOCIAL WORK
Case Management Discharge Note      Final Note: Received a callback from [facility] with [facility] who accepted referral and will pass to intake for review on Monday.  Referral faxed to [facility] at [phone].  Spoke with patient and family at bedside to advise that HH referral is still pending.  Patient plan is to discharge home.  CM will follow up regarding HH on Monday.         Selected Continued Care - Admitted Since 2/10/2023     Destination    No services have been selected for the patient.              Durable Medical Equipment    No services have been selected for the patient.              Dialysis/Infusion    No services have been selected for the patient.              Home Medical Care    No services have been selected for the patient.              Therapy    No services have been selected for the patient.              Community Resources    No services have been selected for the patient.              Community & DME    No services have been selected for the patient.                       Final Discharge Disposition Code: 01 - home or self-care

## 2023-02-11 NOTE — DISCHARGE SUMMARY
Date of Discharge:  2/11/2023    Discharge Diagnosis: ICD upgrade    Presenting Problem/History of Present Illness  CHF (congestive heart failure), NYHA class III, chronic, systolic (HCC) [I50.22]      Hospital Course  Patient is a 77 y.o. male presented for ICD upgrade to a BiV device with Dr. June. He underwent the procedure on 2/10 with no immediate post-operative complications. Post procedurally he was noted to have hypotension and was admitted for observation overnight. His BP improved and the following morning he was feeling at his baseline. He was instructed to hold spironolactone for now and he will have close follow-up for possible resumption of his GDMT.     Procedures Performed  Procedure(s):  DDD ICD upgrade to Biv ([facility]), no meds to hold       No results found for this or any previous visit.       Consults:   Consults     No orders found for last 30 day(s).        Condition on Discharge:  good      Discharge Disposition  Home or Self Care    Discharge Medications     Discharge Medications      Continue These Medications      Instructions Start Date   albuterol (2.5 MG/3ML) 0.083% nebulizer solution  Commonly known as: PROVENTIL   2.5 mg, Nebulization, 2 Times Daily      ascorbic acid 1000 MG tablet  Commonly known as: VITAMIN C   2,000 mg, Oral, Daily      aspirin 81 MG EC tablet   81 mg, Oral, Daily      atorvastatin 40 MG tablet  Commonly known as: LIPITOR   40 mg, Oral, Daily      carvedilol 6.25 MG tablet  Commonly known as: COREG   6.25 mg, Oral, 2 Times Daily With Meals      cholecalciferol 25 MCG (1000 UT) tablet  Commonly known as: VITAMIN D3   1,000 Units, Oral, Daily      diphenhydrAMINE 25 mg capsule  Commonly known as: BENADRYL   25 mg, Oral, Every 6 Hours PRN      finasteride 5 MG tablet  Commonly known as: PROSCAR   5 mg, Oral, Daily      fluticasone 50 MCG/ACT nasal spray  Commonly known as: FLONASE   2 sprays, Nasal, Daily      fluticasone-salmeterol 100-50 MCG/DOSE DISKUS  Commonly  known as: ADVAIR   1 puff, Inhalation, Daily      furosemide 40 MG tablet  Commonly known as: LASIX   40 mg, Oral, Daily      HYDROcodone-acetaminophen 7.5-500 MG per tablet  Commonly known as: VICODIN   1 tablet, Oral, Every 6 Hours PRN      Incruse Ellipta 62.5 MCG/ACT aerosol powder   Generic drug: Umeclidinium Bromide   1 puff, Inhalation, As Needed      meclizine 25 MG tablet  Commonly known as: ANTIVERT   25 mg, Oral, 3 Times Daily PRN      montelukast 10 MG tablet  Commonly known as: SINGULAIR   10 mg, Oral, Nightly      multivitamin with minerals tablet tablet   1 tablet, Oral, Daily      O2  Commonly known as: OXYGEN   2 L/min, Inhalation, As Needed      Omega 3 1000 MG capsule   Oral      omeprazole 20 MG capsule  Commonly known as: priLOSEC   20 mg, Oral, Daily      predniSONE 5 MG tablet  Commonly known as: DELTASONE   5 mg, Oral, Daily      sacubitril-valsartan 49-51 MG tablet  Commonly known as: ENTRESTO   1 tablet, Oral, 2 Times Daily      spironolactone 25 MG tablet  Commonly known as: ALDACTONE   25 mg, Oral, Daily      Vitamin A 3 MG (11537 UT) capsule   10,000 Units, Oral, Daily      vitamin B-12 1000 MCG tablet  Commonly known as: CYANOCOBALAMIN   1,000 mcg, Oral, Daily      vitamin E 100 UNIT capsule   100 Units, Oral, Daily         Stop These Medications    amLODIPine 5 MG tablet  Commonly known as: NORVASC            Discharge Diet: Cardiac    Activity at Discharge: As tolerated    Follow-up Appointments  Future Appointments   Date Time Provider Department Center   2/17/2023  1:30 PM WOUND CHECK [facility]   5/15/2023  2:00 PM Frank Marks PA E [facility]   12/1/2023 11:00 AM Glynn June MD [facility]     Additional Instructions for the Follow-ups that You Need to Schedule     Ambulatory Referral to Home Health (Hospital)   As directed      Face to Face Visit Date: 2/11/2023    Follow-up provider for Plan of Care?: I treated the patient in an acute care facility and  will not continue treatment after discharge.    Follow-up provider: LAKESHA JUNE [[id number]]    Reason/Clinical Findings: Needs potable oygen 2 l    Describe mobility limitations that make leaving home difficult: COPD/CHF    Nursing/Therapeutic Services Requested: Home Monitoring (Select INITIATE PROTOCOL order from panel below)    Frequency: 1 Week 1         Discharge Follow-up with Specialty: Dr. June; 3 Months   As directed      Specialty: Dr. June    Follow Up: 3 Months    Follow Up Details: Wound check in 7-10 days. Follow up with Dr. June in 3 months s/p BiV upgrade BSC ICD               Test Results Pending at Discharge        Time: I spent  20  minutes on this discharge activity which included: face-to-face encounter with the patient, reviewing the data in the system, coordination of the care with the nursing staff as well as consultants, documentation, and entering orders.       Billy Das MD  02/11/23  11:37 EST

## 2023-02-11 NOTE — PROGRESS NOTES
Bryn Lynn  [id number]  1945   LOS: 0 days   Patient Care Team:  Sumi Robledo APRN as PCP - General    Chief Complaint: Follow-up on upgrade to BiV ICD    Subjective The patient is status post upgrade to BiV ICD.  Postoperatively he had some hypotension and was observed overnight.  He denies any chest pain but does have some soreness at his ICD site and a little on the left side of his neck.  He denies any chest pressure, increased shortness of breath, edema, or dizziness.  He is comfortable eating breakfast on the side of the bed on oxygen therapy.  He says that he needs a portable tank to use during the day and does not have this to use at home.  RN will contact case management.  Patient wheezing this morning.    Objective     Vital Sign Min/Max for last 24 hours  Temp  Min: 97.7 °F (36.5 °C)  Max: 98 °F (36.7 °C)   BP  Min: 83/60  Max: 142/63   Pulse  Min: 70  Max: 139   Resp  Min: 7  Max: 19   SpO2  Min: 90 %  Max: 100 %   No data recorded   Weight  Min: 76 kg (167 lb 9.6 oz)  Max: 76 kg (167 lb 9.6 oz)         02/10/23  1216   Weight: 76 kg (167 lb 9.6 oz)         Intake/Output Summary (Last 24 hours) at 2/11/2023 0830  Last data filed at 2/11/2023 0000  Gross per 24 hour   Intake --   Output 300 ml   Net -300 ml       Physical Exam:     General Appearance:    Alert, cooperative, in no acute distress   Lungs:    Diffuse wheezing to auscultation,respirations regular, even and                   unlabored, 2 L O2 NC    Heart:   Paced regular and normal rate, normal S1 and S2, no            murmur, no gallop, no rub, no click   Abdomen:  Extremities:   Soft, non-tender, bowel sounds audible x4    No edema, normal range of motion   Pulses:   Pulses palpable and equal bilaterally    Left subclavian ICD site with no evidence of hematoma  Results Review:   Results from last 7 days   Lab Units 02/10/23  1236 02/10/23  1220   SODIUM mmol/L  --  142   POTASSIUM mmol/L  --  4.4   CHLORIDE mmol/L  --  98    CO2 mmol/L  --  34.0*   BUN mg/dL  --  35*   CREATININE mg/dL 1.00 1.09   GLUCOSE mg/dL  --  128*   CALCIUM mg/dL  --  9.2     Results from last 7 days   Lab Units 02/10/23  1236 02/10/23  1220   WBC 10*3/mm3  --  14.25*   HEMOGLOBIN g/dL  --  12.4*   HEMOGLOBIN, POC g/dL 12.2  --    HEMATOCRIT %  --  36.0*   HEMATOCRIT POC % 36*  --    PLATELETS 10*3/mm3  --  221         Results from last 7 days   Lab Units 02/10/23  1220   HEMOGLOBIN A1C % 5.60       Postop chest x-ray 2/10/2023:  Left-sided ICD device in place. No pneumothorax. No active disease.    No new ECG to review    EPS 2/10/2023:  FINAL IMPRESSIONS:    1. Successful removal of an implantable cardioverter-defibrillator generator (dual leads).  2. Successful insertion of an implantable cardioverter-defibrillator generator(multiple leads).  3. Successful insertion of new coronary sinus pacing lead.  4. 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      Medication Review: Reviewed    Assessment & Plan   Patient with chronic systolic heart failure/dilated cardiomyopathy/complete heart block.  He is status post upgrade to biventricular ICD.  Blood pressure has been too low to restart antihypertensive medications.  We will continue to monitor before restarting.  Patient says he is requiring more oxygen during the day at home but needs a portable tank so we will let case management help with this; RN contacting.  Possible discharge later on today or first thing in the morning.      CHF (congestive heart failure), NYHA class III, chronic, systolic (HCC)    Scribed for Deepak Das MD by CHERYL Pena. 2/11/2023  08:49 EST      02/11/23  08:30 EST     I personally performed the services described in this documentation as scribed by the above named individual in my presence, and it is both accurate and complete.    MIR Das MD  02/12/23  08:30 EST

## 2023-02-11 NOTE — DISCHARGE PLACEMENT REQUEST
"Roderick Lynn (77 y.o. Male)     Date of Birth   1945    Social Security Number       Address   [address] [facility] [place] [address]    Home Phone   [phone]    MRN   [id number]       Scientologist   [employer]    Marital Status                               Admission Date   2/10/23    Admission Type   Urgent    Admitting Provider   Glynn June MD    Attending Provider   Glynn June MD    Department, Room/Bed   [address], S477/1       Discharge Date       Discharge Disposition   Home or Self Care    Discharge Destination                               Attending Provider: Glynn June MD    Allergies: Percocet [Oxycodone-acetaminophen]    Isolation: None   Infection: None   Code Status: Not on file    Ht: 177.8 cm (70\")   Wt: 76 kg (167 lb 9.6 oz)    Admission Cmt: None   Principal Problem: CHF (congestive heart failure), NYHA class III, chronic, systolic (HCC) [I50.22]                 Active Insurance as of 2/10/2023     Primary Coverage     Payor Plan Insurance Group Employer/Plan Group    MEDICARE MEDICARE A & B      Payor Plan Address Payor Plan Phone Number Payor Plan Fax Number Effective Dates    [address] [phone]  3/1/2011 - None Entered    [facility] [id number]       Subscriber Name Subscriber Birth Date Member ID       RODERICK LYNN 1945 [id number]           Secondary Coverage     Payor Plan Insurance Group Employer/Plan Group    [place]      Payor Plan Address Payor Plan Phone Number Payor Plan Fax Number Effective Dates    [address] [phone]  6/28/2016 - None Entered    [place] [address]       Subscriber Name Subscriber Birth Date Member ID       RODERICK LYNN 1945 [id number]                 Emergency Contacts      (Rel.) Home Phone Work Phone Mobile Phone    Concepcion Lynn (Spouse) [phone] -- [phone]               History & Physical      Glynn June MD at 02/10/23 " 0840          Cardiology Consult/H&P     Bryn Lynn  1945  [phone]  There is no work phone number on file.    02/10/23    DATE OF ADMISSION: 2/10/2023  [facility] Sumi Arellano, APRN  [address] [facility] [address]  Referring Provider: Glynn June MD     CC: SOB    Problem list:  1. Chronic systolic heart failure/Dilated cardiomyopathy/CHB  a. Dual chamber [employer] pacemaker implant Sept 2013  b. Echo 5/2018: EF 55-60%  c. Echo 10/2018: EF 45%, mild MR  d. Echo 4/2022: EF 30-35%, normal functioning tissue AVR  e. Dual chamber [employer] AICD May 2022 - total occlusion of left subclavian and LV lead was unable to be placed by Dr. Shaver  2. Valvular heart disease status post aortic valve replacement with tissue valve 2013.  3. CAD  a. LHC 2013, nonobstructive CAD  4. HTN  5. HLD  6. COPD on chronic prednisone and 2 L oxygen as needed.  7. GERD  8. Nicotine use (cessation 3/2022)  9. Surgeries:  a. AAA repair, 2008, at   b. Tissue AVR, 2013, at [facility]  c. Umbilical hernia repair 2/2022  d. Prostatectomy, TURP    History of Present Illness:   Bryn Lynn is a 77-year-old male with above past medical history presenting today for scheduled BiV ICD upgrade.  He had a [employer] pacemaker placed September 2013 in the setting of complete heart block.  Due to dilated cardiomyopathy and worsening CHF symptoms with EF of 30 to 35% despite maximal GDMT it was decided patient would benefit from upgrade to BiV ICD.  May 2022 he was upgraded to dual-chamber [employer] AICD by Dr. Shaver however there was difficulty placing an LV lead due to occlusion of left subclavian vein.  Patient has continued to have progressive dyspnea on exertion, fatigue and weakness, class III-IV heart failure symptoms. He went for a stress test last week but was unable to complete the study due to his breathing. He denies CP, palpitations, recent hospitalization, infection,  fevers, symptoms of CVA/TIA.      Allergies   Allergen Reactions   • Percocet [Oxycodone-Acetaminophen] Unknown - Low Severity        Cannot display prior to admission medications because the patient has not been admitted in this contact.            Current Facility-Administered Medications:   •  acetaminophen (TYLENOL) tablet 650 mg, 650 mg, Oral, Q4H PRN, Rashmi Olivier APRN  •  ceFAZolin in dextrose (ANCEF) IVPB solution 2 g, 2 g, Intravenous, Once, Rashmi Olivier APRN  •  nitroglycerin (NITROSTAT) SL tablet 0.4 mg, 0.4 mg, Sublingual, Q5 Min PRN, Rashmi Olivier APRCATHY  •  sodium chloride 0.9 % infusion, 1 mL/kg/hr (Order-Specific), Intravenous, Continuous, Rashmi Olivier APRN, Last Rate: 79.8 mL/hr at 02/10/23 1253, 1 mL/kg/hr at 02/10/23 1253    Social History     Socioeconomic History   • Marital status:    Tobacco Use   • Smoking status: Former     Packs/day: 0.50     Types: Cigarettes     Start date: 1963   • Smokeless tobacco: Never   Substance and Sexual Activity   • Alcohol use: No   • Drug use: No   • Sexual activity: Defer       Family History   Problem Relation Age of Onset   • Heart attack Mother    • Kidney failure Mother    • Heart failure Father        REVIEW OF SYSTEMS:   CONSTITUTIONAL:         No weight loss, fever, chills, +weakness +fatigue.   HEENT:                            No visual loss, blurred vision, double vision, yellow sclerae.                                             No hearing loss, congestion, sore throat.   SKIN:                                No rashes, urticaria, ulcers, sores.     RESPIRATORY:               +shortness of breath,- hemoptysis, cough, sputum.   GI:                                     No anorexia, nausea, vomiting, diarrhea. No abdominal pain, melena.   :                                   No burning on urination, hematuria or increased frequency.  ENDOCRINE:                   No diaphoresis, cold or heat intolerance.  "No polyuria or polydipsia.   NEURO:                            No headache, +dizziness, -syncope, paralysis, ataxia, or parasthesias.                                            No change in bowel or bladder control. No history of CVA/TIA  MUSCULOSKELETAL:    No muscle, back pain, joint pain or stiffness.   HEMATOLOGY:               No anemia, bleeding, bruising. No history of DVT/PE.  PSYCH:                            No history of depression, anxiety    Vitals:    02/10/23 1216 02/10/23 1218   BP: 112/72 102/61   BP Location: Right arm Left arm   Patient Position: Lying Lying   Pulse: 77 78   Resp: 18    Temp: 98 °F (36.7 °C)    TempSrc: Temporal    SpO2: 92%    Weight: 76 kg (167 lb 9.6 oz)    Height: 177.8 cm (70\")          Vital Sign Min/Max for last 24 hours  Temp  Min: 98 °F (36.7 °C)  Max: 98 °F (36.7 °C)   BP  Min: 102/61  Max: 112/72   Pulse  Min: 77  Max: 78   Resp  Min: 18  Max: 18   SpO2  Min: 92 %  Max: 92 %   No data recorded    No intake or output data in the 24 hours ending 02/10/23 1259          Physical Exam:  GEN: Well nourished, Well- developed  No acute distress  HEENT: Normocephalic, Atraumatic, PERRLA, moist mucous membranes  NECK: supple, NO JVD, no thyromegaly, no lymphadenopathy  CARDIAC: S1S2, S4 and S3 present, RRR  LUNGS: Bilateral upper lobe wheezing  ABDOMEN: Soft, nontender, normal bowel sounds  EXTREMITIES:No gross deformities,  No clubbing, cyanosis, or edema  SKIN: Warm, dry  NEURO: No focal deficits  PSYCHIATRIC: Normal affect and mood      I personally viewed and interpreted the patient's EKG/Telemetry/lab data    Data:   Results from last 7 days   Lab Units 02/10/23  1236 02/10/23  1220   WBC 10*3/mm3  --  14.25*   HEMOGLOBIN g/dL  --  12.4*   HEMOGLOBIN, POC g/dL 12.2  --    HEMATOCRIT %  --  36.0*   HEMATOCRIT POC % 36*  --    PLATELETS 10*3/mm3  --  221     Results from last 7 days   Lab Units 02/10/23  1236   CREATININE mg/dL 1.00                                  No intake " or output data in the 24 hours ending 02/10/23 1259      Telemetry: V-paced, HR 80 bpm        Assessment and Plan:   1.  Chronic systolic heart failure/Dilated cardiomyopathy/CHB  -s/p dual chamber [employer] pacemaker implant Sept 2013  -Attempt to upgrade to BiV ICD by Dr. Shaver however LV lead unable to be placed due to total occlusion of left subclavian   -NYHA class III-IV congestive heart failure symptoms despite optimal medical therapy.   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 Patient understands all the risk and benefits of the procedure in detail and wishes to pursue.  -We will proceed with upgrade to BiV pacemaker ICD today      2.  COPD/home oxygen/chronic prednisone: Encourage persistent discontinuation of his tobacco use.     3.  Valvular heart disease: Per Dr. Arcos.    Electronically signed by CHERYL Patterson, 02/10/23, 9:01 AM EST.              Electronically signed by Glynn June MD at 02/10/23 1259         Current Facility-Administered Medications   Medication Dose Route Frequency Provider Last Rate Last Admin   • acetaminophen (TYLENOL) tablet 650 mg  650 mg Oral Q4H PRN Glynn June MD   650 mg at 02/11/23 0608    Or   • acetaminophen (TYLENOL) suppository 650 mg  650 mg Rectal Q4H PRN Glynn June MD       • albuterol (PROVENTIL) nebulizer solution 0.083% 2.5 mg/3mL  2.5 mg Nebulization BID - RT Rashmi Olivier APRN   2.5 mg at 02/11/23 1031   • ondansetron (ZOFRAN) injection 4 mg  4 mg Intravenous Q6H PRN Glynn June MD       • predniSONE (DELTASONE) tablet 5 mg  5 mg Oral Daily Rashmi Olivier APRN   5 mg at  23 0842   • sodium chloride 0.9 % flush 10 mL  10 mL Intravenous PRN Lakesha June MD       • sodium chloride 0.9 % flush 3 mL  3 mL Intravenous Q12H Lakesha June MD       • sodium chloride 0.9 % infusion 40 mL  40 mL Intravenous PRN Lakesha June MD              [address] [address]  Phone:  [phone]  Fax:  [phone] Date: 2023      Ambulatory Referral to Home Health ([facility])     Patient:  Bryn Lynn MRN:  [id number]   Jasmin PEDROZA  [facility] [address] :  1945  SSN:    Phone: [phone] Sex:  M      INSURANCE PAYOR PLAN GROUP # SUBSCRIBER ID   Primary:  Secondary:    MEDICARE  [place] [id number]  [id number]      [id number]  [id number]      Referring Provider Information:  LAKESHA JUNE Phone: [phone] Fax: [phone]       Referral Information:   # Visits:  999 Referral Type: Home Health [42]   Urgency:  Routine Referral Reason: Specialty Services Required   Start Date: 2023 End Date:  To be determined by Insurer   Diagnosis: Chronic systolic congestive heart failure, NYHA class 3 (HCC) (I50.22 [ICD-10-CM] 428.22,428.0 [ICD-9-CM])  Simple chronic bronchitis (HCC) (J41.0 [ICD-10-CM] 491.0 [ICD-9-CM])      Refer to Dept:   Refer to Provider:   Refer to Provider Phone:   Refer to Facility:       Face to Face Visit Date: 2023  Follow-up provider for Plan of Care? I treated the patient in an acute care facility and will not continue treatment after discharge.  Follow-up provider: LAKESHA JUNE [[id number]]  Reason/Clinical Findings: Needs potable oygen 2 l  Describe mobility limitations that make leaving home difficult: COPD/CHF  Nursing/Therapeutic Services Requested: Home Monitoring (Select INITIATE PROTOCOL order from panel below)  Frequency: 1 Week 1     This document serves as a request of services and does not constitute Insurance authorization or approval of  services.  To determine eligibility, please contact the members Insurance carrier to verify and review coverage.     If you have medical questions regarding this request for services. Please contact [address] at [phone] during normal business hours.        Authorizing Provider:Glynn June MD  Authorizing Provider's NPI: [id number]  Order Entered By: Glynn June MD 2/11/2023  9:17 AM     Electronically signed by: Glynn June MD 2/11/2023  9:17 AM

## 2023-02-11 NOTE — DISCHARGE INSTRUCTIONS
Hold your SPIRONOLACTONE when you go home  Continue your ENTRESTO and CARVEDILOL and monitor you BP at home. If your BP is dropping below 100 you can hold your medications and you should call your primary cardiologist for advice and to try to get in to be seen    Follow-up with your PCP in 1 week for a BP check and if your BP is adequate you can resume spironolactone at that time.

## 2023-02-16 NOTE — PROGRESS NOTES
"[facility]  Heart and Valve Center    Chief Complaint  Congestive Heart Failure and Establish Care    Subjective    History of Present Illness {CC  Problem List  Visit  Diagnosis   Encounters  Notes  Medications  Labs  Result Review Imaging  Media :23}     Bryn Lynn is a 77 y.o. male with HFrEF/dilated cardiomyopathy, valvular heart disease status post aortic valve replacement 2013, hypertension, hyperlipidemia, COPD, GERD, complete heart block who presents today as a hospital referral for HFrEF.    Patient underwent ICD upgrade to BiV device with Dr. June on 2/10 with no immediate postoperative complications.  Postprocedure he was noted to have some hypotension and was admitted for observation overnight.  His spironolactone was held and amlodipine stopped.  He reports systolic blood pressures have been in the 120s to 140s at home.  He denies any dizziness or lightheadedness.  He notes some chronic shortness of breath, which appears to be stable.  He notes some swelling but is primary localized at his left arm close to his ICD implant.  He requested referral to establish care with general cardiology here at [facility]          Objective     Vital Signs:   Vitals:    02/17/23 1357 02/17/23 1359   BP: 140/60 133/59   BP Location: Right arm Right arm   Patient Position: Sitting Standing   Cuff Size: Adult Adult   Pulse: 91 95   Resp: 20    Temp: 98.3 °F (36.8 °C)    TempSrc: Temporal    SpO2: 96% 95%   Weight: 76.9 kg (169 lb 8 oz)    Height: 177.8 cm (70\")      Body mass index is 24.32 kg/m².  Physical Exam  Vitals reviewed.   Constitutional:       Appearance: Normal appearance.   HENT:      Head: Normocephalic.   Neck:      Vascular: No carotid bruit.   Cardiovascular:      Rate and Rhythm: Normal rate and regular rhythm.      Pulses: Normal pulses.      Heart sounds: S1 normal and S2 normal. Murmur heard.    Systolic murmur is present with a grade of 2/6.  Pulmonary:      Effort: " Pulmonary effort is normal. No respiratory distress.      Breath sounds: Decreased breath sounds and wheezing present.   Chest:      Chest wall: No tenderness.   Abdominal:      General: Abdomen is flat.      Palpations: Abdomen is soft.   Musculoskeletal:      Cervical back: Neck supple.      Right lower leg: No edema.      Left lower leg: No edema.   Skin:     General: Skin is warm and dry.   Neurological:      General: No focal deficit present.      Mental Status: He is alert and oriented to person, place, and time. Mental status is at baseline.   Psychiatric:         Mood and Affect: Mood normal.         Behavior: Behavior normal.         Thought Content: Thought content normal.              Result Review  Data Reviewed:{ Labs  Result Review  Imaging  Med Tab  Media :23}   Basic Metabolic Panel (02/11/2023 08:02)  POC CHEM 8 (02/10/2023 12:36)  Hemoglobin A1c (02/10/2023 12:20)  XR Chest PA & Lateral (02/10/2023 16:50)  EP/CRM Study (02/10/2023 14:42)  Stress Test With Myocardial Perfusion One Day (01/06/2023 15:28)             Assessment and Plan {CC Problem List  Visit Diagnosis  ROS  Review (Popup)  Health Maintenance  Quality  BestPractice  Medications  SmartSets  SnapShot Encounters  Media :23}   1. CHF (congestive heart failure), NYHA class III, chronic, systolic (HCC)  -He appears to be euvolemic today.  He has significant shortness of breath, but I feel this is more related to his COPD  -He brought in a bag of all of his medications and I went through all of them and discuss which ones he should be taking.  It turns out he was taking 80 mg of Lasix.  I advised him he could cut this down to 40 mg, but resume his spironolactone 25 mg daily.  -Heart failure education provided today including signs and symptoms, causes of heart failure, medications, daily weights, low sodium diet (less than 1500 mg per day).. Reviewed HF Zones with patient and family.  -Continue carvedilol, Entresto,  furosemide 40 mg daily, spironolactone. Consider addition of Jardiance in the future   - Requested last echo from [place]. He would like to see cardiology here at [place]  - Ambulatory Referral to Cardiology    2. S/P AVR (aortic valve replacement)  - Will obtain recent echo  - Ambulatory Referral to Cardiology    3. Primary hypertension  -Blood pressures appear to be stable.  I advised him to call if he has consistently low blood pressures, dizziness or lightheadedness.  -It appears he used to be on metoprolol succinate which was changed to carvedilol.  If he does have some symptomatic dizziness, may benefit from changing this back to metoprolol succinate to gain better rate control  -Advised to continue to stay off amlodipine    Follow Up {Instructions Charge Capture  Follow-up Communications :23}   Return if symptoms worsen or fail to improve.    Patient was given instructions and counseling regarding his condition or for health maintenance advice. Please see specific information pulled into the AVS if appropriate.  Advised to call the Heart and Valve Center with any questions, concerns, or worsening symptoms.

## 2023-02-17 ENCOUNTER — OFFICE VISIT (OUTPATIENT)
Dept: CARDIOLOGY | Facility: HOSPITAL | Age: 78
End: 2023-02-17
Payer: MEDICARE

## 2023-02-17 ENCOUNTER — OFFICE VISIT (OUTPATIENT)
Dept: CARDIOLOGY | Facility: CLINIC | Age: 78
End: 2023-02-17
Payer: MEDICARE

## 2023-02-17 VITALS
BODY MASS INDEX: 24.26 KG/M2 | OXYGEN SATURATION: 95 % | DIASTOLIC BLOOD PRESSURE: 59 MMHG | HEIGHT: 70 IN | HEART RATE: 95 BPM | RESPIRATION RATE: 20 BRPM | SYSTOLIC BLOOD PRESSURE: 133 MMHG | TEMPERATURE: 98.3 F | WEIGHT: 169.5 LBS

## 2023-02-17 DIAGNOSIS — I10 PRIMARY HYPERTENSION: ICD-10-CM

## 2023-02-17 DIAGNOSIS — I50.22 CHF (CONGESTIVE HEART FAILURE), NYHA CLASS III, CHRONIC, SYSTOLIC: Primary | ICD-10-CM

## 2023-02-17 DIAGNOSIS — Z95.2 S/P AVR (AORTIC VALVE REPLACEMENT): ICD-10-CM

## 2023-02-17 PROCEDURE — 99214 OFFICE O/P EST MOD 30 MIN: CPT | Performed by: NURSE PRACTITIONER

## 2023-02-17 PROCEDURE — 99024 POSTOP FOLLOW-UP VISIT: CPT | Performed by: INTERNAL MEDICINE

## 2023-02-17 NOTE — PROGRESS NOTES
2023    Bryn Lynn, : 1945      Fever: N/A    Temperature if indicated:     Wound Location: Left Infraclavicular    Dressing Removed: Removed by MA/RN      Old Dressing Appearance:  Clean, dry    Wound Appearance: Redness []                  Drainage []                  Culture obtained []        Color: Clear     Consistency: N/A     Amount: N/A         Gloves used, wound cleansed with sterile 4x4 and peroxide [x]       MD notified []     MD orders:     Antibiotic started []      Appointment for follow-up scheduled for 3 months post procedure [x]    Future Appointments   Date Time Provider Department Center   2023  2:00 PM Opal Kramer APRN [facility]   5/15/2023  2:00 PM Frank Marks PA [facility]   2023 11:00 AM Glynn June MD [facility]           Teagan Marrero MA, 23      MD Signature:______________________________ Completed By/Date:

## 2023-02-23 ENCOUNTER — TELEPHONE (OUTPATIENT)
Dept: CARDIOLOGY | Facility: HOSPITAL | Age: 78
End: 2023-02-23
Payer: MEDICARE

## 2023-02-23 NOTE — TELEPHONE ENCOUNTER
Called patient and discussed symptoms.  Swelling is mainly in his left arm and started a year ago after his first pacemaker.  Advised him that this is not typically a sign of heart failure and I did not see any swelling when I saw him in the clinic.  He tells me this was because he was on 80 mg of Lasix.  I told him if he is having worsening shortness of breath and weight gain he could double up on his Lasix for a few days but would not take the Lasix just for left arm swelling.  If he continues to have left arm swelling would follow-up with his PCP or make another appointment with us.  Advised to call if he continues to have elevated blood pressures.  He verbalized understanding with no further questions or concerns

## 2023-02-23 NOTE — TELEPHONE ENCOUNTER
Pt stated that his weight is between 168 -171 and the shortness of breath is maybe a little worse but not like it was when he was admitted to the hospital.  He says blood pressures are higher in the morning.     2/19 152/73   127/76  2/20 163/84   127/66  2/21 177/98   138/73  2/22  157/91  2/23 206/107  137/77.      He takes most medication in the evening because he gets up after most of the time after 12 pm.   He understood to take 40 mg of lasix if symptoms are worse once in the morning and once in the afternoon for three days.

## 2023-02-23 NOTE — TELEPHONE ENCOUNTER
Patient called with c/o edema in his left arm/hand and left thigh edema. He states that he has experienced some dizziness and symptoms started around 1 week ago. He states that the edema and dizziness has gradually gotten worse over the week. He was decreased to 40mg of Lasix daily and told to resume his Spironolactone. He states that he has taken both medications at the same time from time to time. He states that this morning he was concerned with the fluid so decided to take 80 mg of Lasix and Spironolactone at the same time. He reports that his blood pressure around 11:30 was 206/107 HR 79. He rechecked the pressure around 12:00 and states that it was 137/77 HR 81. Wt 169.2    [phone]

## 2023-02-23 NOTE — TELEPHONE ENCOUNTER
Unilateral swelling is not usually a sign of heart failure especially if it is upper extremities. Has he had weight gain or worsening shortness of breath? Has his blood pressures been stable besides today? It was low in the hospital. If he has worsening shortness of breath or weight gain then he can take 40mg of lasix in the morning and another dose in the afternoon for 3 days. If he is still symptomatic, it is best we see him in clinic.

## 2023-04-10 ENCOUNTER — TELEPHONE (OUTPATIENT)
Dept: CARDIOLOGY | Facility: HOSPITAL | Age: 78
End: 2023-04-10
Payer: MEDICARE

## 2023-04-10 NOTE — TELEPHONE ENCOUNTER
[facility] home health called EP with concerns for ongoing BP elevations around 170-200 and ongoing left arm and abdominal swelling.  Reports that he still is taking 40 mg of Lasix, but sometimes will take 60 mg on his own.  He still has not contacted us to make an appointment.  I have advised his nurse to increase Lasix to 40 mg twice daily and to make an appointment with us or his local cardiologist.  She verbalized understanding with no further questions or concerns

## 2023-04-13 ENCOUNTER — TELEPHONE (OUTPATIENT)
Dept: CARDIOLOGY | Facility: CLINIC | Age: 78
End: 2023-04-13
Payer: MEDICARE

## 2023-04-13 RX ORDER — CARVEDILOL 12.5 MG/1
12.5 TABLET ORAL 2 TIMES DAILY
Qty: 60 TABLET | Refills: 6 | Status: SHIPPED | OUTPATIENT
Start: 2023-04-13

## 2023-04-13 NOTE — TELEPHONE ENCOUNTER
Called patient and he reports that he has been taking the 40mg of lasix twice a day and he says that it helps him, but as soon as he reduces it  he feels that the swelling and shortness of breath gets worse.  He reports that he did have recent labs with his PCP.  I told him he would continue the 40 mg twice a day but will need repeat labs next week.  We will also make him a follow-up in about a week as well.  He tells me that he canceled his appointment with his local cardiologist.  I have discouraged that and recommend that he still keep a local cardiologist.  Advised him to call and reschedule this appointment

## 2023-04-13 NOTE — TELEPHONE ENCOUNTER
When I spoke with the patient, he requested that you call him to discuss his Lasix dosing.    Thanks

## 2023-04-14 ENCOUNTER — TELEPHONE (OUTPATIENT)
Dept: CARDIOLOGY | Facility: HOSPITAL | Age: 78
End: 2023-04-14
Payer: MEDICARE

## 2023-04-19 ENCOUNTER — TELEPHONE (OUTPATIENT)
Dept: CARDIOLOGY | Facility: HOSPITAL | Age: 78
End: 2023-04-19
Payer: MEDICARE

## 2023-04-19 DIAGNOSIS — I50.22 CHF (CONGESTIVE HEART FAILURE), NYHA CLASS III, CHRONIC, SYSTOLIC: Primary | ICD-10-CM

## 2023-04-19 NOTE — TELEPHONE ENCOUNTER
I received labs but they were drawn 3/14. Did he have more recent labs? He was supposed to have some this week.

## 2023-04-19 NOTE — TELEPHONE ENCOUNTER
Patient has an appt with Dr. Arcos on 4/25 at 2:20 at [facility].    Will send order to PCP for patient to get done and reach out to patient and let him know.

## 2023-04-19 NOTE — TELEPHONE ENCOUNTER
"He told me he had a \"standing order\". I went ahead and put a BMP an proBNP. Did he get an appt with Dr. Shaver's group?   "

## 2023-04-19 NOTE — TELEPHONE ENCOUNTER
PCP reports that patient's latest labs were what they sent but he called earlier today and  Was requesting an order to be sent. The only active order I see is from 2/7. Do I need to send this to his PCP?

## 2023-04-21 NOTE — TELEPHONE ENCOUNTER
Patient ask the PCP to have a standing order. They sent back labs requesting frequency if they are to be done regularly.

## 2023-04-21 NOTE — TELEPHONE ENCOUNTER
A standing order needs to be ordered by his PCP since they will be the one following up with him.  This is a one-time order just to see how his kidney function is doing on the increased dose of Lasix

## 2023-06-01 ENCOUNTER — OFFICE VISIT (OUTPATIENT)
Dept: CARDIOLOGY | Facility: CLINIC | Age: 78
End: 2023-06-01

## 2023-06-01 VITALS
DIASTOLIC BLOOD PRESSURE: 62 MMHG | OXYGEN SATURATION: 95 % | HEART RATE: 90 BPM | SYSTOLIC BLOOD PRESSURE: 138 MMHG | WEIGHT: 187 LBS | HEIGHT: 70 IN | BODY MASS INDEX: 26.77 KG/M2

## 2023-06-01 DIAGNOSIS — I44.2 CHB (COMPLETE HEART BLOCK): Primary | ICD-10-CM

## 2023-06-01 DIAGNOSIS — Z95.2 S/P AVR (AORTIC VALVE REPLACEMENT): ICD-10-CM

## 2023-06-01 DIAGNOSIS — Z95.0 PACEMAKER: ICD-10-CM

## 2023-06-01 RX ORDER — CARVEDILOL 6.25 MG/1
6.25 TABLET ORAL 2 TIMES DAILY WITH MEALS
COMMUNITY

## 2023-06-01 NOTE — PROGRESS NOTES
[facility]   OFFICE NOTE      Bryn Lynn  1945  PCP: Miguel Ramirez PA-C    SUBJECTIVE:   Bryn Lynn is a 78 y.o. male seen for a follow up visit regarding the following:     CC:CHF    HPI:   Pleasant 78-year-old gent returns today for follow-up regarding nonischemic cardiomyopathy, chronic congestive heart failure, valvular heart disease and upgrade to [employer] BiV ICD in January 2023.  This is the patient's first visit back since the upgrade of the device.  He has followed briefly briefly with the [facility]. He is followed by Dr. Arcos for CHF. He had intolerance to Farixta and higher doses of Coreg. He is monitoring his daily weights and managing his diuretics.     Cardiac PMH: (Old records have been reviewed and summarized below)  1. Chronic systolic heart failure/Dilated cardiomyopathy/CHB  a. Dual chamber [employer] pacemaker implant Sept 2013  b. Echo 5/2018: EF 55-60%  c. Echo 10/2018: EF 45%, mild MR  d. Echo 4/2022: EF 30-35%, normal functioning tissue AVR  e. Dual chamber [employer] AICD May 2022 - total occlusion of left subclavian and LV lead was unable to be placed by Dr. Shaver  f. Upgrade to [employer] BiV ICD January 11, 2023 Dr. June.  2. Valvular heart disease status post aortic valve replacement with tissue valve 2013.  3. CAD  a. C 2013, nonobstructive CAD  4. HTN  5. HLD  6. LBBB  7. COPD on chronic prednisone and 2 L oxygen as needed.  8. GERD  9. Nicotine use (cessation 3/2022)  10. Surgeries:  a. AAA repair, 2008, at   b. Tissue AVR, 2013, at [facility]  c. Umbilical hernia repair 2/2022  d. Prostatectomy, TURP    Past Medical History, Past Surgical History, Family history, Social History, and Medications were all reviewed with the patient today and updated as necessary.       Current Outpatient Medications:   •  albuterol (PROVENTIL) (2.5 MG/3ML) 0.083% nebulizer solution, Take 2.5 mg by  nebulization 2 (Two) Times a Day., Disp: , Rfl:   •  ascorbic acid (VITAMIN C) 1000 MG tablet, Take 2 tablets by mouth Daily., Disp: , Rfl:   •  aspirin 81 MG EC tablet, Take 1 tablet by mouth Daily., Disp: , Rfl:   •  atorvastatin (LIPITOR) 40 MG tablet, Take 1 tablet by mouth Daily., Disp: , Rfl:   •  carvedilol (COREG) 6.25 MG tablet, Take 1 tablet by mouth 2 (Two) Times a Day With Meals., Disp: , Rfl:   •  cholecalciferol (VITAMIN D3) 25 MCG (1000 UT) tablet, Take 1 tablet by mouth Daily., Disp: , Rfl:   •  diphenhydrAMINE (BENADRYL) 25 mg capsule, Take 1 capsule by mouth Every 6 (Six) Hours As Needed for Itching., Disp: , Rfl:   •  finasteride (PROSCAR) 5 MG tablet, Take 1 tablet by mouth Daily., Disp: , Rfl:   •  fluticasone (FLONASE) 50 MCG/ACT nasal spray, 2 sprays into the nostril(s) as directed by provider Daily., Disp: , Rfl:   •  fluticasone-salmeterol (ADVAIR) 100-50 MCG/DOSE DISKUS, Inhale 1 puff Daily., Disp: , Rfl:   •  furosemide (LASIX) 40 MG tablet, Take 1 tablet by mouth Daily., Disp: , Rfl:   •  HYDROcodone-acetaminophen (VICODIN) 7.5-500 MG per tablet, Take 1 tablet by mouth Every 6 (Six) Hours As Needed for Moderate Pain., Disp: , Rfl:   •  meclizine (ANTIVERT) 25 MG tablet, Take 1 tablet by mouth 3 (Three) Times a Day As Needed for Dizziness., Disp: , Rfl:   •  montelukast (SINGULAIR) 10 MG tablet, Take 1 tablet by mouth Every Night., Disp: , Rfl:   •  multivitamin with minerals tablet tablet, Take 1 tablet by mouth Daily., Disp: , Rfl:   •  O2 (OXYGEN), Inhale 2 L/min As Needed., Disp: , Rfl:   •  Omega 3 1000 MG capsule, Take 1 capsule by mouth Daily., Disp: , Rfl:   •  omeprazole (PriLOSEC) 20 MG capsule, Take 1 capsule by mouth Daily., Disp: , Rfl:   •  predniSONE (DELTASONE) 5 MG tablet, Take 1 tablet by mouth Daily., Disp: , Rfl:   •  sacubitril-valsartan (ENTRESTO) 49-51 MG tablet, Take 1 tablet by mouth 2 (Two) Times a Day., Disp: , Rfl:   •  spironolactone (ALDACTONE) 25 MG tablet,  "Take 1 tablet by mouth Daily., Disp: , Rfl:   •  Umeclidinium Bromide (Incruse Ellipta) 62.5 MCG/ACT aerosol powder , Inhale 1 puff As Needed., Disp: , Rfl:   •  Vitamin A 3 MG (96203 UT) capsule, Take 1 capsule by mouth Daily., Disp: , Rfl:   •  vitamin B-12 (CYANOCOBALAMIN) 1000 MCG tablet, Take 1 tablet by mouth Daily., Disp: , Rfl:   •  vitamin E 100 UNIT capsule, Take 1 capsule by mouth Daily., Disp: , Rfl:       Allergies   Allergen Reactions   • Percocet [Oxycodone-Acetaminophen] Unknown - Low Severity         PHYSICAL EXAM:    /62 (BP Location: Right arm, Patient Position: Sitting)   Pulse 90   Ht 177.8 cm (70\")   Wt 84.8 kg (187 lb)   SpO2 95%   BMI 26.83 kg/m²        Wt Readings from Last 5 Encounters:   06/01/23 84.8 kg (187 lb)   02/17/23 76.9 kg (169 lb 8 oz)   02/10/23 76 kg (167 lb 9.6 oz)   01/06/23 79.8 kg (176 lb)   01/03/17 96.4 kg (212 lb 9.6 oz)       BP Readings from Last 5 Encounters:   06/01/23 138/62   02/17/23 133/59   02/11/23 117/54   01/06/23 138/72   01/03/17 142/86       General appearance - Alert, well appearing, and in no distress   Mental status - Affect appropriate to mood.  Eyes - Sclerae anicteric,  ENMT - Hearing grossly normal bilaterally, Dental hygiene good.  Neck - Carotids upstroke normal bilaterally, no bruits, no JVD.  Resp - Clear to auscultation, no wheezes, rales or rhonchi, symmetric air entry.  Heart - Normal rate, regular rhythm, normal S1, S2, no murmurs, rubs, clicks or gallops.  GI - Soft, nontender, nondistended, no masses or organomegaly.  Neurological - Grossly intact - normal speech, no focal findings  Musculoskeletal - No joint tenderness, deformity or swelling, no muscular tenderness noted.  Extremities - Peripheral pulses normal, no pedal edema, no clubbing or cyanosis.  Skin - Normal coloration and turgor.  Psych -  oriented to person, place, and time.    Medical problems and test results were reviewed with the patient today.     No results " found for this or any previous visit (from the past 672 hour(s)).      Device Interrogation:  Please see device interrogation form which has been signed and updated for full details.  [employer] bivicd.  DDDR at 60.  A paced 13%.  BiV paced 9 9%.  Normal thresholds and impedances.  Battery voltage 10 years remaining.  No A-fib.    ASSESSMENT   1.  Nonischemic dilated cardiomyopathy: Last ejection fraction EF 35%.  Status post upgrade of ICD to BiV ICD in view of left bundle branch block class II-IIIcongestive heart failure symptoms.    2.  Chronic congestive heart failure: Currently on carvedilol, Entresto.  Patient appears euvolemic. Followed by Dr. Roque.     3.  [employer] BiV ICD interrogation: Normal.    4.  Advanced COPD: Chronic prednisone therapy    5. VHD: Remote tissue AVR.    PLAN  · Continue Medical therapy , Follow up Echo in [place]   · Follow up in [facility] 6 months.  Electronically signed by TRIP Yuen, 06/01/23, 3:30 PM EDT.            6/1/2023  15:28 EDT  Will Kendrick MONTANO

## 2023-12-01 ENCOUNTER — OFFICE VISIT (OUTPATIENT)
Dept: CARDIOLOGY | Facility: CLINIC | Age: 78
End: 2023-12-01
Payer: MEDICARE

## 2023-12-01 VITALS
OXYGEN SATURATION: 99 % | HEART RATE: 68 BPM | SYSTOLIC BLOOD PRESSURE: 108 MMHG | WEIGHT: 210 LBS | HEIGHT: 69 IN | DIASTOLIC BLOOD PRESSURE: 60 MMHG | BODY MASS INDEX: 31.1 KG/M2

## 2023-12-01 DIAGNOSIS — Z95.2 S/P AVR (AORTIC VALVE REPLACEMENT): ICD-10-CM

## 2023-12-01 DIAGNOSIS — I50.22 CHF (CONGESTIVE HEART FAILURE), NYHA CLASS III, CHRONIC, SYSTOLIC: Primary | ICD-10-CM

## 2023-12-01 DIAGNOSIS — J41.0 SIMPLE CHRONIC BRONCHITIS: ICD-10-CM

## 2023-12-01 DIAGNOSIS — I38 VHD (VALVULAR HEART DISEASE): ICD-10-CM

## 2023-12-01 RX ORDER — MULTIVITAMIN WITH IRON
250 TABLET ORAL DAILY
COMMUNITY

## 2023-12-01 RX ORDER — POTASSIUM CHLORIDE 20 MEQ/1
20 TABLET, EXTENDED RELEASE ORAL 2 TIMES DAILY
COMMUNITY
Start: 2023-07-06 | End: 2024-07-05

## 2023-12-01 RX ORDER — BUSPIRONE HYDROCHLORIDE 10 MG/1
10 TABLET ORAL 2 TIMES DAILY
COMMUNITY
Start: 2023-11-13

## 2023-12-01 RX ORDER — TORSEMIDE 100 MG/1
100 TABLET ORAL 2 TIMES DAILY
COMMUNITY

## 2023-12-01 NOTE — PROGRESS NOTES
Bryn Lynn  1945  [phone]      12/01/2023      [facility] CARDIOLOGY     Miguel Ramirez PA-C  [address] [address]    Chief Complaint   Patient presents with    CHF (congestive heart failure), NYHA class III, chronic, sy       Problem List:    Cardiac PMH: (Old records have been reviewed and summarized below)  Chronic systolic heart failure/Dilated cardiomyopathy/CHB  Dual chamber [employer] pacemaker implant Sept 2013  Echo 5/2018: EF 55-60%  Echo 10/2018: EF 45%, mild MR  Echo 4/2022: EF 30-35%, normal functioning tissue AVR  Dual chamber [employer] AICD May 2022 - total occlusion of left subclavian and LV lead was unable to be placed by Dr. Shaver  Upgrade to [employer] BiV ICD January 11, 2023 Dr. June with venoplasty of the left subclavian vein/innominate system.  Echocardiogram 7/5/2023 LVEF 41%, bioprosthetic valve function stable.  Echocardiogram 11/3/2023 LVEF 55%, mild aortic valve stenosis  Valvular heart disease status post aortic valve replacement with tissue valve 2013.  CAD  [facility] 2013, nonobstructive CAD  HTN  HLD  LBBB  COPD on chronic prednisone and 2 L oxygen as needed.  GERD  Nicotine use (cessation 3/2022)  Surgeries:  AAA repair, 2008, at   Tissue AVR, 2013, at [facility]  Umbilical hernia repair 2/2022  Prostatectomy, TURP  Allergies  Allergies   Allergen Reactions    Oxycodone Unknown - Low Severity    Percocet [Oxycodone-Acetaminophen] Unknown - Low Severity       Current Medications    Current Outpatient Medications:     albuterol (PROVENTIL) (2.5 MG/3ML) 0.083% nebulizer solution, Take 2.5 mg by nebulization 2 (Two) Times a Day., Disp: , Rfl:     aspirin 81 MG EC tablet, Take 1 tablet by mouth Daily., Disp: , Rfl:     busPIRone (BUSPAR) 10 MG tablet, Take 1 tablet by mouth 2 (Two) Times a Day., Disp: , Rfl:     carvedilol (COREG) 12.5 MG tablet, Take 1 tablet by mouth 2 (Two) Times a Day With Meals., Disp: , Rfl:      cholecalciferol (VITAMIN D3) 25 MCG (1000 UT) tablet, Take 1 tablet by mouth Daily., Disp: , Rfl:     finasteride (PROSCAR) 5 MG tablet, Take 1 tablet by mouth Daily., Disp: , Rfl:     HYDROcodone-acetaminophen (VICODIN) 7.5-500 MG per tablet, Take 1 tablet by mouth Every 6 (Six) Hours As Needed for Moderate Pain., Disp: , Rfl:     Magnesium 250 MG tablet, Take 1 tablet by mouth Daily., Disp: , Rfl:     montelukast (SINGULAIR) 10 MG tablet, Take 1 tablet by mouth Every Night., Disp: , Rfl:     multivitamin with minerals tablet tablet, Take 1 tablet by mouth Daily., Disp: , Rfl:     O2 (OXYGEN), Inhale 2 L/min As Needed., Disp: , Rfl:     omeprazole (PriLOSEC) 20 MG capsule, Take 1 capsule by mouth Daily., Disp: , Rfl:     potassium chloride (K-DUR,KLOR-CON) 20 MEQ CR tablet, Take 1 tablet by mouth 2 (Two) Times a Day., Disp: , Rfl:     predniSONE (DELTASONE) 5 MG tablet, Take 1 tablet by mouth Daily., Disp: , Rfl:     sacubitril-valsartan (ENTRESTO) 49-51 MG tablet, Take 1 tablet by mouth 2 (Two) Times a Day., Disp: , Rfl:     torsemide (DEMADEX) 100 MG tablet, Take 1 tablet by mouth 2 (Two) Times a Day. 0.5 tablet twice daily, Disp: , Rfl:     vitamin B-12 (CYANOCOBALAMIN) 1000 MCG tablet, Take 1 tablet by mouth Daily., Disp: , Rfl:     History of Present Illness     Pt presents for follow up of CHF/DCM/VHD. Since the pt has seen us in clinic last, pt states that he underwent colonoscopy and EGD.  The colonoscopy demonstrated a polyp that was biopsied.  Also underwent stretching of his esophagus apparently per the family.  He continues to complain of easy dyspnea on exertion with shortness of breath any exertional activity.  He also has intermittent lower extremity edema.  No chest pain.  No near-syncope or syncope.  No ICD discharges.  He is on chronic oxygen 2 L.  He underwent a most recent echocardiogram in November of this year with Dr. Donahue which demonstrated normalization of his LVEF.  His Coreg was  "increased at that time.  He brings in a list of blood pressures with him that of been running of 140 over 60 mm at mercury.  He states he has been compliant with his medical therapy.        Vitals:    12/01/23 1058   BP: 108/60   BP Location: Left arm   Patient Position: Sitting   Pulse: 68   SpO2: 99%   Weight: 95.3 kg (210 lb)   Height: 175.3 cm (69\")       PE:  General: NAD  Neck: no JVD, no carotid bruits, no TM  Heart: RRR, NL S1, S2, S4 present, no rubs, murmurs  Lungs: Poor airway movement bilaterally.  Wheezes noted.  Rhonchi noted.  Abd: soft, non-tender, NL BS  Ext: No musculoskeletal deformities, only scant edema at both ankles.  Psych: normal mood and affect    Diagnostic Data:  Procedures    [employer] bivicd.  DDDR at 60.  A paced 8%.  BiV paced 9 8%.  Normal thresholds and impedances.  Battery voltage 10 years remaining.  No A-fib.  No VT.  LV threshold 2.2 V at 1 ms which has been stable.    1. CHF (congestive heart failure), NYHA class III, chronic, systolic    2. S/P AVR (aortic valve replacement)    3. Simple chronic bronchitis    4. VHD (valvular heart disease)            Plan:    1.  Nonischemic dilated cardiomyopathy: Last ejection fraction EF 35%.  Status post upgrade of ICD to BiV ICD.  Now with normalization of LVEF based on echocardiogram November 3 of this year.  At this point I do not feel that his dyspnea on exertion is related to his underlying dilated cardiomyopathy.  Is most likely pulmonary in nature.     2.  Chronic congestive heart failure: Currently on carvedilol, Entresto.  Patient appears euvolemic. Followed by .     3.  Advanced COPD: See #1.      4. VHD: Remote tissue AVR.  Per Dr. Donahue    F/up in 6 months      "

## (undated) DEVICE — LIMB HOLDER, WRIST/ANKLE: Brand: DEROYAL

## (undated) DEVICE — ST EXT IV SMRTSTE 2VLV FIX M LL 6ML 41

## (undated) DEVICE — BALN PRESS WEDGE 6F 110CM

## (undated) DEVICE — BALN EVERCROSS OTW .035 6F 9X40 80

## (undated) DEVICE — 3M™ STERI-STRIP™ REINFORCED ADHESIVE SKIN CLOSURES, R1547, 1/2 IN X 4 IN (12 MM X 100 MM), 6 STRIPS/ENVELOPE: Brand: 3M™ STERI-STRIP™

## (undated) DEVICE — DEV INFL MONARCH 25W

## (undated) DEVICE — ST INF PRI SMRTSTE 20DRP 2VLV 24ML 117

## (undated) DEVICE — RADIFOCUS GLIDEWIRE ADVANTAGE GUIDEWIRE: Brand: GLIDEWIRE ADVANTAGE

## (undated) DEVICE — INTRO PEELAWAY SAFESHEATH II HEMO10F23CM

## (undated) DEVICE — CATH COURNARD DECCA CSL 6F120CM

## (undated) DEVICE — LEX ELECTRO PHYSIOLOGY: Brand: MEDLINE INDUSTRIES, INC.

## (undated) DEVICE — DRSNG SURESITE123 4X4.8IN

## (undated) DEVICE — SET PRIMARY GRVTY 10DP MALE LL 104IN

## (undated) DEVICE — CANN NASL CO2 DIVIDED A/

## (undated) DEVICE — INTRO TEAR AWAY/LVD W/SD PRT 7F 13CM

## (undated) DEVICE — IRRIGATOR BULB ASEPTO 60CC STRL

## (undated) DEVICE — MEDI-VAC YANKAUER SUCTION HANDLE W/BULBOUS TIP: Brand: CARDINAL HEALTH

## (undated) DEVICE — INTRO SHEATH ART/FEM ENGAGE .038 5F12CM

## (undated) DEVICE — GUIDE WIRE WITH HYDROPHILIC COATING: Brand: ACUITY WHISPER VIEW™

## (undated) DEVICE — ADULT, W/LG. BACK PAD, RADIOTRANSPARENT ELEMENT AND LEAD WIRE COMPATIBLE W/: Brand: DEFIBRILLATION ELECTRODES

## (undated) DEVICE — INTRO TEAR AWAY/LVD W/SD PRT 9.5F 13CM

## (undated) DEVICE — Device

## (undated) DEVICE — DECANT BG O JET

## (undated) DEVICE — PLASMABLADE PS210-030S 3.0S LOCK: Brand: PLASMABLADE™

## (undated) DEVICE — ELECTRD RETRN/GRND MEGADYNE SGL/PLT W/CORD 9FT DISP

## (undated) DEVICE — TUBING, SUCTION, 1/4" X 10', STRAIGHT: Brand: MEDLINE

## (undated) DEVICE — CAUTERY TIP POLISHER: Brand: DEVON

## (undated) DEVICE — RADIFOCUS GLIDEWIRE: Brand: GLIDEWIRE

## (undated) DEVICE — SOL NACL 0.9PCT 1000ML

## (undated) DEVICE — INTRO PEELAWAY SAFESHEATH II HEMO 9F23CM